# Patient Record
Sex: MALE | Race: OTHER | NOT HISPANIC OR LATINO | URBAN - METROPOLITAN AREA
[De-identification: names, ages, dates, MRNs, and addresses within clinical notes are randomized per-mention and may not be internally consistent; named-entity substitution may affect disease eponyms.]

---

## 2018-06-16 ENCOUNTER — INPATIENT (INPATIENT)
Facility: HOSPITAL | Age: 63
LOS: 4 days | Discharge: HOME | DRG: 179 | End: 2018-06-21
Attending: INTERNAL MEDICINE | Admitting: INTERNAL MEDICINE
Payer: COMMERCIAL

## 2018-06-16 VITALS — HEIGHT: 66 IN | WEIGHT: 154.98 LBS

## 2018-06-16 DIAGNOSIS — J18.9 PNEUMONIA, UNSPECIFIED ORGANISM: ICD-10-CM

## 2018-06-16 DIAGNOSIS — Z29.9 ENCOUNTER FOR PROPHYLACTIC MEASURES, UNSPECIFIED: ICD-10-CM

## 2018-06-16 LAB
ALBUMIN SERPL ELPH-MCNC: 2.8 G/DL — LOW (ref 3.5–5)
ALP SERPL-CCNC: 67 U/L — SIGNIFICANT CHANGE UP (ref 40–120)
ALT FLD-CCNC: 90 U/L DA — HIGH (ref 10–60)
ANION GAP SERPL CALC-SCNC: 9 MMOL/L — SIGNIFICANT CHANGE UP (ref 5–17)
APPEARANCE UR: CLEAR — SIGNIFICANT CHANGE UP
AST SERPL-CCNC: 113 U/L — HIGH (ref 10–40)
BASOPHILS # BLD AUTO: 0 K/UL — SIGNIFICANT CHANGE UP (ref 0–0.2)
BASOPHILS NFR BLD AUTO: 0.8 % — SIGNIFICANT CHANGE UP (ref 0–2)
BILIRUB SERPL-MCNC: 0.4 MG/DL — SIGNIFICANT CHANGE UP (ref 0.2–1.2)
BILIRUB UR-MCNC: NEGATIVE — SIGNIFICANT CHANGE UP
BUN SERPL-MCNC: 14 MG/DL — SIGNIFICANT CHANGE UP (ref 7–18)
CALCIUM SERPL-MCNC: 8.3 MG/DL — LOW (ref 8.4–10.5)
CHLORIDE SERPL-SCNC: 100 MMOL/L — SIGNIFICANT CHANGE UP (ref 96–108)
CO2 SERPL-SCNC: 25 MMOL/L — SIGNIFICANT CHANGE UP (ref 22–31)
COLOR SPEC: YELLOW — SIGNIFICANT CHANGE UP
CREAT SERPL-MCNC: 0.99 MG/DL — SIGNIFICANT CHANGE UP (ref 0.5–1.3)
DIFF PNL FLD: ABNORMAL
EOSINOPHIL # BLD AUTO: 0 K/UL — SIGNIFICANT CHANGE UP (ref 0–0.5)
EOSINOPHIL NFR BLD AUTO: 0 % — SIGNIFICANT CHANGE UP (ref 0–6)
GLUCOSE SERPL-MCNC: 126 MG/DL — HIGH (ref 70–99)
GLUCOSE UR QL: 50 MG/DL
HCT VFR BLD CALC: 38.8 % — LOW (ref 39–50)
HGB BLD-MCNC: 12.8 G/DL — LOW (ref 13–17)
KETONES UR-MCNC: ABNORMAL
LACTATE SERPL-SCNC: 1.1 MMOL/L — SIGNIFICANT CHANGE UP (ref 0.7–2)
LEUKOCYTE ESTERASE UR-ACNC: NEGATIVE — SIGNIFICANT CHANGE UP
LYMPHOCYTES # BLD AUTO: 0.5 K/UL — LOW (ref 1–3.3)
LYMPHOCYTES # BLD AUTO: 9.2 % — LOW (ref 13–44)
MCHC RBC-ENTMCNC: 30.7 PG — SIGNIFICANT CHANGE UP (ref 27–34)
MCHC RBC-ENTMCNC: 33.1 GM/DL — SIGNIFICANT CHANGE UP (ref 32–36)
MCV RBC AUTO: 92.9 FL — SIGNIFICANT CHANGE UP (ref 80–100)
MONOCYTES # BLD AUTO: 0.3 K/UL — SIGNIFICANT CHANGE UP (ref 0–0.9)
MONOCYTES NFR BLD AUTO: 5 % — SIGNIFICANT CHANGE UP (ref 2–14)
NEUTROPHILS # BLD AUTO: 4.7 K/UL — SIGNIFICANT CHANGE UP (ref 1.8–7.4)
NEUTROPHILS NFR BLD AUTO: 85 % — HIGH (ref 43–77)
NITRITE UR-MCNC: NEGATIVE — SIGNIFICANT CHANGE UP
PH UR: 6 — SIGNIFICANT CHANGE UP (ref 5–8)
PLATELET # BLD AUTO: 244 K/UL — SIGNIFICANT CHANGE UP (ref 150–400)
POTASSIUM SERPL-MCNC: 3.2 MMOL/L — LOW (ref 3.5–5.3)
POTASSIUM SERPL-SCNC: 3.2 MMOL/L — LOW (ref 3.5–5.3)
PROT SERPL-MCNC: 7 G/DL — SIGNIFICANT CHANGE UP (ref 6–8.3)
PROT UR-MCNC: 30 MG/DL
RAPID RVP RESULT: SIGNIFICANT CHANGE UP
RBC # BLD: 4.17 M/UL — LOW (ref 4.2–5.8)
RBC # FLD: 12.7 % — SIGNIFICANT CHANGE UP (ref 10.3–14.5)
SODIUM SERPL-SCNC: 134 MMOL/L — LOW (ref 135–145)
SP GR SPEC: 1.02 — SIGNIFICANT CHANGE UP (ref 1.01–1.02)
UROBILINOGEN FLD QL: 1
WBC # BLD: 5.5 K/UL — SIGNIFICANT CHANGE UP (ref 3.8–10.5)
WBC # FLD AUTO: 5.5 K/UL — SIGNIFICANT CHANGE UP (ref 3.8–10.5)

## 2018-06-16 PROCEDURE — 99284 EMERGENCY DEPT VISIT MOD MDM: CPT

## 2018-06-16 PROCEDURE — 71045 X-RAY EXAM CHEST 1 VIEW: CPT | Mod: 26

## 2018-06-16 RX ORDER — ONDANSETRON 8 MG/1
4 TABLET, FILM COATED ORAL ONCE
Qty: 0 | Refills: 0 | Status: COMPLETED | OUTPATIENT
Start: 2018-06-16 | End: 2018-06-16

## 2018-06-16 RX ORDER — IPRATROPIUM/ALBUTEROL SULFATE 18-103MCG
3 AEROSOL WITH ADAPTER (GRAM) INHALATION EVERY 6 HOURS
Qty: 0 | Refills: 0 | Status: DISCONTINUED | OUTPATIENT
Start: 2018-06-16 | End: 2018-06-21

## 2018-06-16 RX ORDER — IBUPROFEN 200 MG
600 TABLET ORAL THREE TIMES A DAY
Qty: 0 | Refills: 0 | Status: DISCONTINUED | OUTPATIENT
Start: 2018-06-16 | End: 2018-06-21

## 2018-06-16 RX ORDER — ONDANSETRON 8 MG/1
4 TABLET, FILM COATED ORAL EVERY 6 HOURS
Qty: 0 | Refills: 0 | Status: DISCONTINUED | OUTPATIENT
Start: 2018-06-16 | End: 2018-06-21

## 2018-06-16 RX ORDER — KETOROLAC TROMETHAMINE 30 MG/ML
30 SYRINGE (ML) INJECTION ONCE
Qty: 0 | Refills: 0 | Status: DISCONTINUED | OUTPATIENT
Start: 2018-06-16 | End: 2018-06-16

## 2018-06-16 RX ORDER — ACETAMINOPHEN 500 MG
650 TABLET ORAL EVERY 6 HOURS
Qty: 0 | Refills: 0 | Status: DISCONTINUED | OUTPATIENT
Start: 2018-06-16 | End: 2018-06-21

## 2018-06-16 RX ORDER — ENOXAPARIN SODIUM 100 MG/ML
40 INJECTION SUBCUTANEOUS DAILY
Qty: 0 | Refills: 0 | Status: DISCONTINUED | OUTPATIENT
Start: 2018-06-16 | End: 2018-06-21

## 2018-06-16 RX ORDER — POTASSIUM CHLORIDE 20 MEQ
40 PACKET (EA) ORAL ONCE
Qty: 0 | Refills: 0 | Status: COMPLETED | OUTPATIENT
Start: 2018-06-16 | End: 2018-06-16

## 2018-06-16 RX ORDER — SODIUM CHLORIDE 9 MG/ML
2000 INJECTION INTRAMUSCULAR; INTRAVENOUS; SUBCUTANEOUS ONCE
Qty: 0 | Refills: 0 | Status: COMPLETED | OUTPATIENT
Start: 2018-06-16 | End: 2018-06-16

## 2018-06-16 RX ORDER — ACETAMINOPHEN 500 MG
650 TABLET ORAL ONCE
Qty: 0 | Refills: 0 | Status: COMPLETED | OUTPATIENT
Start: 2018-06-16 | End: 2018-06-16

## 2018-06-16 RX ORDER — PANTOPRAZOLE SODIUM 20 MG/1
40 TABLET, DELAYED RELEASE ORAL DAILY
Qty: 0 | Refills: 0 | Status: DISCONTINUED | OUTPATIENT
Start: 2018-06-16 | End: 2018-06-21

## 2018-06-16 RX ORDER — SODIUM CHLORIDE 9 MG/ML
1000 INJECTION INTRAMUSCULAR; INTRAVENOUS; SUBCUTANEOUS
Qty: 0 | Refills: 0 | Status: DISCONTINUED | OUTPATIENT
Start: 2018-06-16 | End: 2018-06-18

## 2018-06-16 RX ADMIN — SODIUM CHLORIDE 1000 MILLILITER(S): 9 INJECTION INTRAMUSCULAR; INTRAVENOUS; SUBCUTANEOUS at 13:46

## 2018-06-16 RX ADMIN — Medication 30 MILLIGRAM(S): at 13:52

## 2018-06-16 RX ADMIN — Medication 30 MILLIGRAM(S): at 13:46

## 2018-06-16 RX ADMIN — Medication 650 MILLIGRAM(S): at 13:46

## 2018-06-16 RX ADMIN — Medication 40 MILLIEQUIVALENT(S): at 15:07

## 2018-06-16 RX ADMIN — SODIUM CHLORIDE 75 MILLILITER(S): 9 INJECTION INTRAMUSCULAR; INTRAVENOUS; SUBCUTANEOUS at 21:16

## 2018-06-16 NOTE — H&P ADULT - HISTORY OF PRESENT ILLNESS
Patient is a 62M from home, lives with wife and son, with PMH former smoker presenting with 1 week dry cough, generalized myalgias, fever (max 103), nausea, abdominal pain and poor po intake. Reports that his building is undergoing alot of window construction and he has inhaled alot of chemicals over the past week, does not have an AC in his apartment. No sick contacts or travel. Denies SOB, palpitations, diarrhea or constipation. PCP prescribed po augmentin 875mg BID x 1 week, has not completed course, and PRN ibuprofen for fever, but symptoms have not resolved. First time patient has had these symptoms.    PMH: as per HPI  Surgical Hx: nose surgery  Fam Hx: father- heart disease, mother- DM  Social Hx: former smoker- 1 pack/day - quit 5 years ago- 30 pack year; neg for etoh  Allergies: NKDA

## 2018-06-16 NOTE — H&P ADULT - NSHPLABSRESULTS_GEN_ALL_CORE
LABS:                        12.8   5.5   )-----------( 244      ( 16 Jun 2018 13:42 )             38.8     06-16    134<L>  |  100  |  14  ----------------------------<  126<H>  3.2<L>   |  25  |  0.99    Ca    8.3<L>      16 Jun 2018 13:42    TPro  7.0  /  Alb  2.8<L>  /  TBili  0.4  /  DBili  x   /  AST  113<H>  /  ALT  90<H>  /  AlkPhos  67  06-16    < from: Xray Chest 1 View- PORTABLE-Urgent (06.16.18 @ 13:45) >    IMPRESSION:    Left lower lung consolidation, suspicious for pneumonia.     < end of copied text >

## 2018-06-16 NOTE — H&P ADULT - NSHPPHYSICALEXAM_GEN_ALL_CORE
INTERVAL HPI/OVERNIGHT EVENTS:  T(C): 37.9 (06-16-18 @ 15:26), Max: 38.7 (06-16-18 @ 13:11)  HR: 75 (06-16-18 @ 15:26) (75 - 90)  BP: 102/63 (06-16-18 @ 15:26) (102/63 - 121/81)  RR: 18 (06-16-18 @ 15:26) (18 - 18)  SpO2: 96% (06-16-18 @ 15:26) (96% - 97%)    PHYSICAL EXAM:  GENERAL: NAD, well-groomed, well-developed  HEAD:  Atraumatic, Normocephalic  EYES: EOMI, PERRLA, conjunctiva and sclera clear  ENMT: No tonsillar erythema, exudates, or enlargement; Moist mucous membranes, Good dentition, No lesions  NECK: Supple, No JVD, Normal thyroid  NERVOUS SYSTEM:  Alert & Oriented X3, Good concentration; Motor Strength 5/5 B/L upper and lower extremities; DTRs 2+ intact and symmetric  CHEST/LUNG: L lower lobe reduced air entry  HEART: Regular rate and rhythm; No murmurs, rubs, or gallops  ABDOMEN: Soft, Nontender, Nondistended; Bowel sounds present  EXTREMITIES:  2+ Peripheral Pulses, No clubbing, cyanosis, or edema  LYMPH: No lymphadenopathy noted  SKIN: No rashes or lesions

## 2018-06-16 NOTE — ED PROVIDER NOTE - OBJECTIVE STATEMENT
62M p/w fever x 5 days. Patient states went to PCP 3 days ago. Did CXR showing pna. Given augmentin and motrin. Symptoms persist. Now developing headache and myalgia. no SOB or CP. no abd pain. no /GI symptoms. no back pain. no visual changes. no ataxia. no neuro complaints.

## 2018-06-16 NOTE — H&P ADULT - PROBLEM SELECTOR PLAN 1
1 week dry cough, generalized myalgias, fever (max 103), nausea, abdominal pain and poor po intake  f/u RVP  CXR LLL consolidation  CT chest 2/2 former smoker  tylenol PRN  failed po augmentin  levaquin to cover for MRSA and legionella  f/u urine legionella  f/u strep pneumo Ag  bronchodilators  robitussin PRN cough  clear liquid diet and advance as tolerated  protonix IV while NPO for retching 2/2 PNA ? viral  gentle hydration

## 2018-06-16 NOTE — PATIENT PROFILE ADULT. - LANGUAGE ASSISTANCE NEEDED
pt. speaks and understand English/No-Patient/Caregiver offered and refused free interpretation services.

## 2018-06-16 NOTE — H&P ADULT - PROBLEM SELECTOR PLAN 2
IMPROVE VTE Individual Risk Assessment          RISK                                                          Points  [  ] Previous VTE                                                3  [  ] Thrombophilia                                             2  [  ] Lower limb paralysis                                   2        (unable to hold up >15 seconds)    [  ] Current Cancer                                             2         (within 6 months)  [  x] Immobilization > 24 hrs                              1  [  ] ICU/CCU stay > 24 hours                             1  [ x ] Age > 60                                                         1    IMPROVE VTE Score: lovenox for dvt ppx IMPROVE VTE Individual Risk Assessment.          RISK                                                          Points  [  ] Previous VTE                                                3  [  ] Thrombophilia                                             2  [  ] Lower limb paralysis                                   2        (unable to hold up >15 seconds)    [  ] Current Cancer                                             2         (within 6 months)  [  x] Immobilization > 24 hrs                              1  [  ] ICU/CCU stay > 24 hours                             1  [ x ] Age > 60                                                         1    IMPROVE VTE Score: lovenox for dvt ppx

## 2018-06-17 LAB
ALBUMIN SERPL ELPH-MCNC: 2.2 G/DL — LOW (ref 3.5–5)
ALP SERPL-CCNC: 61 U/L — SIGNIFICANT CHANGE UP (ref 40–120)
ALT FLD-CCNC: 95 U/L DA — HIGH (ref 10–60)
ANION GAP SERPL CALC-SCNC: 10 MMOL/L — SIGNIFICANT CHANGE UP (ref 5–17)
APPEARANCE UR: CLEAR — SIGNIFICANT CHANGE UP
AST SERPL-CCNC: 115 U/L — HIGH (ref 10–40)
BASOPHILS # BLD AUTO: 0 K/UL — SIGNIFICANT CHANGE UP (ref 0–0.2)
BASOPHILS NFR BLD AUTO: 0.7 % — SIGNIFICANT CHANGE UP (ref 0–2)
BILIRUB SERPL-MCNC: 0.3 MG/DL — SIGNIFICANT CHANGE UP (ref 0.2–1.2)
BILIRUB UR-MCNC: NEGATIVE — SIGNIFICANT CHANGE UP
BUN SERPL-MCNC: 14 MG/DL — SIGNIFICANT CHANGE UP (ref 7–18)
CALCIUM SERPL-MCNC: 7.3 MG/DL — LOW (ref 8.4–10.5)
CHLORIDE SERPL-SCNC: 106 MMOL/L — SIGNIFICANT CHANGE UP (ref 96–108)
CHOLEST SERPL-MCNC: 134 MG/DL — SIGNIFICANT CHANGE UP (ref 10–199)
CO2 SERPL-SCNC: 21 MMOL/L — LOW (ref 22–31)
COLOR SPEC: YELLOW — SIGNIFICANT CHANGE UP
CREAT SERPL-MCNC: 0.79 MG/DL — SIGNIFICANT CHANGE UP (ref 0.5–1.3)
DIFF PNL FLD: ABNORMAL
EOSINOPHIL # BLD AUTO: 0 K/UL — SIGNIFICANT CHANGE UP (ref 0–0.5)
EOSINOPHIL NFR BLD AUTO: 0.1 % — SIGNIFICANT CHANGE UP (ref 0–6)
FOLATE SERPL-MCNC: 17.3 NG/ML — SIGNIFICANT CHANGE UP
GLUCOSE SERPL-MCNC: 115 MG/DL — HIGH (ref 70–99)
GLUCOSE UR QL: 50 MG/DL
HBA1C BLD-MCNC: 6.6 % — HIGH (ref 4–5.6)
HCT VFR BLD CALC: 34.6 % — LOW (ref 39–50)
HDLC SERPL-MCNC: 20 MG/DL — LOW (ref 40–125)
HGB BLD-MCNC: 11.7 G/DL — LOW (ref 13–17)
INR BLD: 1.22 RATIO — HIGH (ref 0.88–1.16)
KETONES UR-MCNC: ABNORMAL
LEGIONELLA AG UR QL: POSITIVE
LEUKOCYTE ESTERASE UR-ACNC: NEGATIVE — SIGNIFICANT CHANGE UP
LIPID PNL WITH DIRECT LDL SERPL: 86 MG/DL — SIGNIFICANT CHANGE UP
LYMPHOCYTES # BLD AUTO: 0.8 K/UL — LOW (ref 1–3.3)
LYMPHOCYTES # BLD AUTO: 15.3 % — SIGNIFICANT CHANGE UP (ref 13–44)
MAGNESIUM SERPL-MCNC: 2.2 MG/DL — SIGNIFICANT CHANGE UP (ref 1.6–2.6)
MCHC RBC-ENTMCNC: 31.2 PG — SIGNIFICANT CHANGE UP (ref 27–34)
MCHC RBC-ENTMCNC: 33.9 GM/DL — SIGNIFICANT CHANGE UP (ref 32–36)
MCV RBC AUTO: 92.2 FL — SIGNIFICANT CHANGE UP (ref 80–100)
MONOCYTES # BLD AUTO: 0.3 K/UL — SIGNIFICANT CHANGE UP (ref 0–0.9)
MONOCYTES NFR BLD AUTO: 5.8 % — SIGNIFICANT CHANGE UP (ref 2–14)
NEUTROPHILS # BLD AUTO: 4 K/UL — SIGNIFICANT CHANGE UP (ref 1.8–7.4)
NEUTROPHILS NFR BLD AUTO: 78.2 % — HIGH (ref 43–77)
NITRITE UR-MCNC: NEGATIVE — SIGNIFICANT CHANGE UP
PH UR: 6.5 — SIGNIFICANT CHANGE UP (ref 5–8)
PHOSPHATE SERPL-MCNC: 1.3 MG/DL — LOW (ref 2.5–4.5)
PLATELET # BLD AUTO: 240 K/UL — SIGNIFICANT CHANGE UP (ref 150–400)
POTASSIUM SERPL-MCNC: 3.5 MMOL/L — SIGNIFICANT CHANGE UP (ref 3.5–5.3)
POTASSIUM SERPL-SCNC: 3.5 MMOL/L — SIGNIFICANT CHANGE UP (ref 3.5–5.3)
PROT SERPL-MCNC: 5.7 G/DL — LOW (ref 6–8.3)
PROT UR-MCNC: 30 MG/DL
PROTHROM AB SERPL-ACNC: 13.4 SEC — HIGH (ref 9.8–12.7)
RBC # BLD: 3.75 M/UL — LOW (ref 4.2–5.8)
RBC # FLD: 12.2 % — SIGNIFICANT CHANGE UP (ref 10.3–14.5)
SODIUM SERPL-SCNC: 137 MMOL/L — SIGNIFICANT CHANGE UP (ref 135–145)
SP GR SPEC: 1.01 — SIGNIFICANT CHANGE UP (ref 1.01–1.02)
TOTAL CHOLESTEROL/HDL RATIO MEASUREMENT: 6.7 RATIO — SIGNIFICANT CHANGE UP (ref 3.4–9.6)
TRIGL SERPL-MCNC: 142 MG/DL — SIGNIFICANT CHANGE UP (ref 10–149)
TSH SERPL-MCNC: 0.53 UU/ML — SIGNIFICANT CHANGE UP (ref 0.34–4.82)
UROBILINOGEN FLD QL: NEGATIVE — SIGNIFICANT CHANGE UP
VIT B12 SERPL-MCNC: 1871 PG/ML — HIGH (ref 232–1245)
WBC # BLD: 5.1 K/UL — SIGNIFICANT CHANGE UP (ref 3.8–10.5)
WBC # FLD AUTO: 5.1 K/UL — SIGNIFICANT CHANGE UP (ref 3.8–10.5)

## 2018-06-17 PROCEDURE — 71250 CT THORAX DX C-: CPT | Mod: 26

## 2018-06-17 RX ADMIN — Medication 3 MILLILITER(S): at 09:23

## 2018-06-17 RX ADMIN — Medication 40 MILLIEQUIVALENT(S): at 00:32

## 2018-06-17 RX ADMIN — PANTOPRAZOLE SODIUM 40 MILLIGRAM(S): 20 TABLET, DELAYED RELEASE ORAL at 11:31

## 2018-06-17 RX ADMIN — Medication 650 MILLIGRAM(S): at 22:39

## 2018-06-17 RX ADMIN — Medication 3 MILLILITER(S): at 14:05

## 2018-06-17 RX ADMIN — Medication 650 MILLIGRAM(S): at 06:09

## 2018-06-17 RX ADMIN — ENOXAPARIN SODIUM 40 MILLIGRAM(S): 100 INJECTION SUBCUTANEOUS at 11:31

## 2018-06-17 RX ADMIN — Medication 600 MILLIGRAM(S): at 22:38

## 2018-06-17 RX ADMIN — Medication 3 MILLILITER(S): at 21:35

## 2018-06-17 RX ADMIN — Medication 3 MILLILITER(S): at 02:50

## 2018-06-17 NOTE — PROGRESS NOTE ADULT - PROBLEM SELECTOR PLAN 2
IMPROVE VTE Individual Risk Assessment.          RISK                                                          Points  [  ] Previous VTE                                                3  [  ] Thrombophilia                                             2  [  ] Lower limb paralysis                                   2        (unable to hold up >15 seconds)    [  ] Current Cancer                                             2         (within 6 months)  [  x] Immobilization > 24 hrs                              1  [  ] ICU/CCU stay > 24 hours                             1  [ x ] Age > 60                                                         1    IMPROVE VTE Score: lovenox for dvt ppx

## 2018-06-17 NOTE — PROGRESS NOTE ADULT - SUBJECTIVE AND OBJECTIVE BOX
chief complaint : fever        SUBJECTIVE / OVERNIGHT EVENTS: pt denies chest pain, shortness of breath, nausea,v        MEDICATIONS  (STANDING):  ALBUTerol/ipratropium for Nebulization 3 milliLiter(s) Nebulizer every 6 hours  enoxaparin Injectable 40 milliGRAM(s) SubCutaneous daily  levoFLOXacin IVPB 500 milliGRAM(s) IV Intermittent every 24 hours  pantoprazole  Injectable 40 milliGRAM(s) IV Push daily  sodium chloride 0.9%. 1000 milliLiter(s) (75 mL/Hr) IV Continuous <Continuous>    MEDICATIONS  (PRN):  acetaminophen   Tablet 650 milliGRAM(s) Oral every 6 hours PRN For Temp greater than 38 C (100.4 F)  guaiFENesin   Syrup  (Sugar-Free) 200 milliGRAM(s) Oral every 6 hours PRN Cough  ibuprofen  Tablet 600 milliGRAM(s) Oral three times a day PRN myalgia  ondansetron Injectable 4 milliGRAM(s) IV Push every 6 hours PRN Nausea and/or Vomiting        CAPILLARY BLOOD GLUCOSE        I&O's Summary    Vital Signs Last 24 Hrs  T(C): 38.2 (2018 21:50), Max: 38.4 (2018 05:58)  T(F): 100.7 (2018 21:50), Max: 101.1 (2018 05:58)  HR: 63 (2018 21:50) (63 - 71)  BP: 126/70 (2018 21:50) (96/57 - 126/70)  BP(mean): --  RR: 17 (2018 21:50) (14 - 17)  SpO2: 100% (2018 21:50) (97% - 100%)    Constitutional: No fever, fatigue  Skin: No rash.  Eyes: No recent vision problems or eye pain.  ENT: No congestion, ear pain, or sore throat.  Cardiovascular: No chest pain or palpation.  Respiratory: No cough, shortness of breath, congestion, or wheezing.  Gastrointestinal: No abdominal pain, nausea, vomiting, or diarrhea.  Genitourinary: No dysuria.  Musculoskeletal: No joint swelling.  Neurologic: No headache.    PHYSICAL EXAM:  GENERAL: NAD  EYES: EOMI, PERRLA  NECK: Supple, No JVD  CHEST/LUNG: dec breath sounds at bases   HEART:  S1 , S2 +  ABDOMEN: soft bs+  EXTREMITIES:  no edema  NEUROLOGY:alert awake calm cooperative      LABS:                        11.7   5.1   )-----------( 240      ( 2018 05:36 )             34.6         137  |  106  |  14  ----------------------------<  115<H>  3.5   |  21<L>  |  0.79    Ca    7.3<L>      2018 05:36  Phos  1.3       Mg     2.2         TPro  5.7<L>  /  Alb  2.2<L>  /  TBili  0.3  /  DBili  x   /  AST  115<H>  /  ALT  95<H>  /  AlkPhos  61      PT/INR - ( 2018 05:36 )   PT: 13.4 sec;   INR: 1.22 ratio               Urinalysis Basic - ( 2018 03:22 )    Color: Yellow / Appearance: Clear / S.015 / pH: x  Gluc: x / Ketone: Moderate  / Bili: Negative / Urobili: Negative   Blood: x / Protein: 30 mg/dL / Nitrite: Negative   Leuk Esterase: Negative / RBC: 2-5 /HPF / WBC 0-2 /HPF   Sq Epi: x / Non Sq Epi: Few /HPF / Bacteria: Few /HPF        RADIOLOGY & ADDITIONAL TESTS:    Imaging Personally Reviewed:    Consultant(s) Notes Reviewed:      Care Discussed with Consultants/Other Providers:

## 2018-06-18 DIAGNOSIS — A48.1 LEGIONNAIRES' DISEASE: ICD-10-CM

## 2018-06-18 LAB
24R-OH-CALCIDIOL SERPL-MCNC: 35.2 NG/ML — SIGNIFICANT CHANGE UP (ref 30–80)
ALBUMIN SERPL ELPH-MCNC: 2.2 G/DL — LOW (ref 3.5–5)
ALP SERPL-CCNC: 62 U/L — SIGNIFICANT CHANGE UP (ref 40–120)
ALT FLD-CCNC: 108 U/L DA — HIGH (ref 10–60)
ANION GAP SERPL CALC-SCNC: 8 MMOL/L — SIGNIFICANT CHANGE UP (ref 5–17)
AST SERPL-CCNC: 91 U/L — HIGH (ref 10–40)
BASOPHILS # BLD AUTO: 0.1 K/UL — SIGNIFICANT CHANGE UP (ref 0–0.2)
BASOPHILS NFR BLD AUTO: 1.3 % — SIGNIFICANT CHANGE UP (ref 0–2)
BILIRUB SERPL-MCNC: 0.4 MG/DL — SIGNIFICANT CHANGE UP (ref 0.2–1.2)
BUN SERPL-MCNC: 7 MG/DL — SIGNIFICANT CHANGE UP (ref 7–18)
CALCIUM SERPL-MCNC: 7.8 MG/DL — LOW (ref 8.4–10.5)
CHLORIDE SERPL-SCNC: 108 MMOL/L — SIGNIFICANT CHANGE UP (ref 96–108)
CO2 SERPL-SCNC: 24 MMOL/L — SIGNIFICANT CHANGE UP (ref 22–31)
CREAT SERPL-MCNC: 0.75 MG/DL — SIGNIFICANT CHANGE UP (ref 0.5–1.3)
CULTURE RESULTS: NO GROWTH — SIGNIFICANT CHANGE UP
EOSINOPHIL # BLD AUTO: 0.1 K/UL — SIGNIFICANT CHANGE UP (ref 0–0.5)
EOSINOPHIL NFR BLD AUTO: 3.2 % — SIGNIFICANT CHANGE UP (ref 0–6)
GLUCOSE SERPL-MCNC: 105 MG/DL — HIGH (ref 70–99)
HCT VFR BLD CALC: 32.8 % — LOW (ref 39–50)
HGB BLD-MCNC: 11.5 G/DL — LOW (ref 13–17)
LYMPHOCYTES # BLD AUTO: 1.4 K/UL — SIGNIFICANT CHANGE UP (ref 1–3.3)
LYMPHOCYTES # BLD AUTO: 31.4 % — SIGNIFICANT CHANGE UP (ref 13–44)
MAGNESIUM SERPL-MCNC: 2.7 MG/DL — HIGH (ref 1.6–2.6)
MCHC RBC-ENTMCNC: 31.9 PG — SIGNIFICANT CHANGE UP (ref 27–34)
MCHC RBC-ENTMCNC: 35 GM/DL — SIGNIFICANT CHANGE UP (ref 32–36)
MCV RBC AUTO: 91.2 FL — SIGNIFICANT CHANGE UP (ref 80–100)
MONOCYTES # BLD AUTO: 0.4 K/UL — SIGNIFICANT CHANGE UP (ref 0–0.9)
MONOCYTES NFR BLD AUTO: 8.5 % — SIGNIFICANT CHANGE UP (ref 2–14)
NEUTROPHILS # BLD AUTO: 2.4 K/UL — SIGNIFICANT CHANGE UP (ref 1.8–7.4)
NEUTROPHILS NFR BLD AUTO: 55.5 % — SIGNIFICANT CHANGE UP (ref 43–77)
PHOSPHATE SERPL-MCNC: 2.5 MG/DL — SIGNIFICANT CHANGE UP (ref 2.5–4.5)
PLATELET # BLD AUTO: 277 K/UL — SIGNIFICANT CHANGE UP (ref 150–400)
POTASSIUM SERPL-MCNC: 3.4 MMOL/L — LOW (ref 3.5–5.3)
POTASSIUM SERPL-SCNC: 3.4 MMOL/L — LOW (ref 3.5–5.3)
PROT SERPL-MCNC: 6 G/DL — SIGNIFICANT CHANGE UP (ref 6–8.3)
RBC # BLD: 3.6 M/UL — LOW (ref 4.2–5.8)
RBC # FLD: 12.3 % — SIGNIFICANT CHANGE UP (ref 10.3–14.5)
SODIUM SERPL-SCNC: 140 MMOL/L — SIGNIFICANT CHANGE UP (ref 135–145)
SPECIMEN SOURCE: SIGNIFICANT CHANGE UP
WBC # BLD: 4.3 K/UL — SIGNIFICANT CHANGE UP (ref 3.8–10.5)
WBC # FLD AUTO: 4.3 K/UL — SIGNIFICANT CHANGE UP (ref 3.8–10.5)

## 2018-06-18 PROCEDURE — 71045 X-RAY EXAM CHEST 1 VIEW: CPT | Mod: 26

## 2018-06-18 RX ORDER — IPRATROPIUM/ALBUTEROL SULFATE 18-103MCG
3 AEROSOL WITH ADAPTER (GRAM) INHALATION ONCE
Qty: 0 | Refills: 0 | Status: COMPLETED | OUTPATIENT
Start: 2018-06-18 | End: 2018-06-18

## 2018-06-18 RX ORDER — POTASSIUM CHLORIDE 20 MEQ
40 PACKET (EA) ORAL ONCE
Qty: 0 | Refills: 0 | Status: COMPLETED | OUTPATIENT
Start: 2018-06-18 | End: 2018-06-18

## 2018-06-18 RX ADMIN — Medication 3 MILLILITER(S): at 06:50

## 2018-06-18 RX ADMIN — Medication 600 MILLIGRAM(S): at 00:20

## 2018-06-18 RX ADMIN — Medication 3 MILLILITER(S): at 20:41

## 2018-06-18 RX ADMIN — Medication 40 MILLIEQUIVALENT(S): at 14:55

## 2018-06-18 RX ADMIN — Medication 3 MILLILITER(S): at 14:05

## 2018-06-18 RX ADMIN — ENOXAPARIN SODIUM 40 MILLIGRAM(S): 100 INJECTION SUBCUTANEOUS at 11:19

## 2018-06-18 RX ADMIN — PANTOPRAZOLE SODIUM 40 MILLIGRAM(S): 20 TABLET, DELAYED RELEASE ORAL at 11:18

## 2018-06-18 RX ADMIN — Medication 3 MILLILITER(S): at 08:55

## 2018-06-18 NOTE — PROGRESS NOTE ADULT - PROBLEM SELECTOR PLAN 1
CT Chest: LLL infiltrate with B/L effusions CT Chest: LLL infiltrate with B/L effusions  Urine Legionella Positive  Low grade fevers  Blood Cx: NTD  C/w Levaquin

## 2018-06-18 NOTE — PROGRESS NOTE ADULT - SUBJECTIVE AND OBJECTIVE BOX
PGY 1 Note discussed with supervising resident and primary attending    Patient is a 62y old  Male who presents with a chief complaint of fever (2018 16:25)      INTERVAL HPI/OVERNIGHT EVENTS: offers no new complaints; current symptoms resolving    MEDICATIONS  (STANDING):  ALBUTerol/ipratropium for Nebulization 3 milliLiter(s) Nebulizer every 6 hours  enoxaparin Injectable 40 milliGRAM(s) SubCutaneous daily  levoFLOXacin IVPB 750 milliGRAM(s) IV Intermittent every 24 hours  pantoprazole  Injectable 40 milliGRAM(s) IV Push daily  sodium chloride 0.9%. 1000 milliLiter(s) (75 mL/Hr) IV Continuous <Continuous>    MEDICATIONS  (PRN):  acetaminophen   Tablet 650 milliGRAM(s) Oral every 6 hours PRN For Temp greater than 38 C (100.4 F)  guaiFENesin   Syrup  (Sugar-Free) 200 milliGRAM(s) Oral every 6 hours PRN Cough  ibuprofen  Tablet 600 milliGRAM(s) Oral three times a day PRN myalgia  ondansetron Injectable 4 milliGRAM(s) IV Push every 6 hours PRN Nausea and/or Vomiting      __________________________________________________    Vital Signs Last 24 Hrs  T(C): 36.7 (2018 05:55), Max: 38.2 (2018 21:50)  T(F): 98 (2018 05:55), Max: 100.7 (2018 21:50)  HR: 59 (2018 05:55) (59 - 70)  BP: 132/79 (2018 05:55) (96/57 - 132/79)  BP(mean): --  RR: 18 (2018 05:55) (14 - 18)  SpO2: 97% (2018 05:55) (97% - 100%)    ________________________________________________  PHYSICAL EXAM:  GENERAL: NAD Young [place] male   HEENT: Normocephalic;  conjunctivae and sclerae clear; moist mucous membranes;   NECK : supple  CHEST/LUNG: Clear to auscultation bilaterally, LLL infiltrate, Mild cough    HEART: S1 S2  regular; no murmurs, gallops or rubs  ABDOMEN: Soft, Nontender, Nondistended; Bowel sounds present  EXTREMITIES: no cyanosis; no edema; no calf tenderness  SKIN: warm and dry; no rash  NERVOUS SYSTEM:  Awake and alert x 3    _________________________________________________  LABS:                        11.5   4.3   )-----------( 277      ( 2018 06:17 )             32.8     06-18    140  |  108  |  7   ----------------------------<  105<H>  3.4<L>   |  24  |  0.75    Ca    7.8<L>      2018 06:17  Phos  2.5     -18  Mg     2.7     -18    TPro  6.0  /  Alb  2.2<L>  /  TBili  0.4  /  DBili  x   /  AST  91<H>  /  ALT  108<H>  /  AlkPhos  62  06-18    PT/INR - ( 2018 05:36 )   PT: 13.4 sec;   INR: 1.22 ratio           Urinalysis Basic - ( 2018 03:22 )    Color: Yellow / Appearance: Clear / S.015 / pH: x  Gluc: x / Ketone: Moderate  / Bili: Negative / Urobili: Negative   Blood: x / Protein: 30 mg/dL / Nitrite: Negative   Leuk Esterase: Negative / RBC: 2-5 /HPF / WBC 0-2 /HPF   Sq Epi: x / Non Sq Epi: Few /HPF / Bacteria: Few /HPF        RADIOLOGY & ADDITIONAL TESTS:    CT Chest: Small bilateral effusions. Left lower lobe low-density consolidation with   air bronchograms likely pneumonia. Correlate clinically and follow to   resolution.      Plan of care was discussed with patient and /or primary care giver; all questions and concerns were addressed and care was aligned with patient's wishes.

## 2018-06-18 NOTE — PROGRESS NOTE ADULT - PROBLEM SELECTOR PLAN 2
IMPROVE VTE Individual Risk Assessment.          RISK                                                          Points  [  ] Previous VTE                                                3  [  ] Thrombophilia                                             2  [  ] Lower limb paralysis                                   2        (unable to hold up >15 seconds)    [  ] Current Cancer                                             2         (within 6 months)  [  x] Immobilization > 24 hrs                              1  [  ] ICU/CCU stay > 24 hours                             1  [ x ] Age > 60                                                         1    IMPROVE VTE Score: lovenox for dvt ppx IMPROVE VTE Individual Risk Assessment.          RISK                                                          Points  [  ] Previous VTE                                                3  [  ] Thrombophilia                                             2  [  ] Lower limb paralysis                                   2        (unable to hold up >15 seconds)    [  ] Current Cancer                                             2         (within 6 months)  [  x] Immobilization > 24 hrs                              1  [  ] ICU/CCU stay > 24 hours                             1  [ x ] Age > 60                                                         1    IMPROVE VTE Score: lovenox for dvt ppx  Protonix for GI ppx

## 2018-06-18 NOTE — CONSULT NOTE ADULT - SUBJECTIVE AND OBJECTIVE BOX
CHIEF COMPLAINT: Patient is a 62y old  Male who presents with a chief complaint of fever (2018 16:25)      HPI:  Patient is a 62M from home, lives with wife and son, with PMH former smoker presenting with 1 week dry cough, generalized myalgias, fever (max 103), nausea, abdominal pain and poor po intake. Reports that his building is undergoing alot of window construction and he has inhaled alot of chemicals over the past week, does not have an AC in his apartment. No sick contacts or travel. Denies SOB, palpitations, diarrhea or constipation. PCP prescribed po augmentin 875mg BID x 1 week, has not completed course, and PRN ibuprofen for fever, but symptoms have not resolved. First time patient has had these symptoms.    PMH: as per HPI  Surgical Hx: nose surgery  Fam Hx: father- heart disease, mother- DM  Social Hx: former smoker- 1 pack/day - quit 5 years ago- 30 pack year; neg for etoh  Allergies: NKDA (2018 16:25)   Patient seen and examined.     PAST MEDICAL & SURGICAL HISTORY:  No pertinent past medical history  No significant past surgical history      Allergies    No Known Allergies    Intolerances        MEDICATIONS  (STANDING):  ALBUTerol/ipratropium for Nebulization 3 milliLiter(s) Nebulizer every 6 hours  enoxaparin Injectable 40 milliGRAM(s) SubCutaneous daily  levoFLOXacin IVPB 750 milliGRAM(s) IV Intermittent every 24 hours  pantoprazole  Injectable 40 milliGRAM(s) IV Push daily  sodium chloride 0.9%. 1000 milliLiter(s) (75 mL/Hr) IV Continuous <Continuous>      MEDICATIONS  (PRN):  acetaminophen   Tablet 650 milliGRAM(s) Oral every 6 hours PRN For Temp greater than 38 C (100.4 F)  guaiFENesin   Syrup  (Sugar-Free) 200 milliGRAM(s) Oral every 6 hours PRN Cough  ibuprofen  Tablet 600 milliGRAM(s) Oral three times a day PRN myalgia  ondansetron Injectable 4 milliGRAM(s) IV Push every 6 hours PRN Nausea and/or Vomiting   Medications up to date at time of exam.    FAMILY HISTORY:  No pertinent family history in first degree relatives      SOCIAL HISTORY  Smoking History: [   ] smoking/smoke exposure, [ x  ] former smoker, [  ] denies smoking  Living Condition: [ x  ] apartment, [   ] private house  Work History: IT  Travel History: denies recent travel  Illicit Substance Use: denies  Alcohol Use: denies    REVIEW OF SYSTEMS:    CONSTITUTIONAL:  denies fevers, chills, sweats, weight loss    HEENT:  denies diplopia or blurred vision, sore throat or runny nose.    CARDIOVASCULAR:  denies pressure, squeezing, tightness, or heaviness about the chest; no palpitations.    RESPIRATORY:  denies SOB, cough, CHURCHILL, wheezing.    GASTROINTESTINAL:  denies abdominal pain, nausea, vomiting or diarrhea.    GENITOURINARY: denies dysuria, frequency or urgency.    NEUROLOGIC:  denies numbness, tingling, seizures or weakness.    PSYCHIATRIC:  denies disorder of thought or mood.    MSK: denies swelling, redness      PHYSICAL EXAMINATION:    GENERAL: The patient is a well-developed, well-nourished, in no apparent distress.     Vital Signs Last 24 Hrs  T(C): 36.7 (2018 05:55), Max: 38.2 (2018 21:50)  T(F): 98 (2018 05:55), Max: 100.7 (2018 21:50)  HR: 59 (2018 05:55) (59 - 70)  BP: 132/79 (2018 05:55) (96/57 - 132/79)  BP(mean): --  RR: 18 (2018 05:55) (14 - 18)  SpO2: 97% (2018 05:55) (97% - 100%)   (if applicable)    Chest Tube (if applicable)    HEENT: Head is normocephalic and atraumatic. .    NECK: Supple, no palpable adenopathy.    LUNGS: Clear to auscultation, no wheezing, rales, or rhonchi.    HEART: Regular rate and rhythm without murmur.    ABDOMEN: Soft, nontender, and nondistended.  No hepatosplenomegaly is noted.    EXTREMITIES: Without any cyanosis, clubbing, rash, lesions or edema.    NEUROLOGIC: Awake, alert.    SKIN: Warm, dry, good turgor.      LABS:                        11.5   4.3   )-----------( 277      ( 2018 06:17 )             32.8     -18    140  |  108  |  7   ----------------------------<  105<H>  3.4<L>   |  24  |  0.75    Ca    7.8<L>      2018 06:17  Phos  2.5       Mg     2.7         TPro  6.0  /  Alb  2.2<L>  /  TBili  0.4  /  DBili  x   /  AST  91<H>  /  ALT  108<H>  /  AlkPhos  62      PT/INR - ( 2018 05:36 )   PT: 13.4 sec;   INR: 1.22 ratio           Urinalysis Basic - ( 2018 03:22 )    Color: Yellow / Appearance: Clear / S.015 / pH: x  Gluc: x / Ketone: Moderate  / Bili: Negative / Urobili: Negative   Blood: x / Protein: 30 mg/dL / Nitrite: Negative   Leuk Esterase: Negative / RBC: 2-5 /HPF / WBC 0-2 /HPF   Sq Epi: x / Non Sq Epi: Few /HPF / Bacteria: Few /HPF                      MICROBIOLOGY: (if applicable)    RADIOLOGY & ADDITIONAL STUDIES:  EKG:   < from: CT Chest No Cont (18 @ 11:59) >    EXAM:  CT CHEST                            PROCEDURE DATE:  2018          INTERPRETATION:  CLINICAL INFORMATION: Portably PO intake. Cough, fever,   former smoker..    Non contrast CT of the Chest was performed. Coronal and sagittal   reconstructions and axial maximum intensity projection images were   obtained.     COMPARISON: Chest x-ray 2018.    CHEST:    Thyroid: Unremarkable  Heart:  Unremarkable.    Lymph nodes: No significant adenopathy.    Lungs and Pleura: Small bilateral effusions. Left lower lobe low-density   consolidation with air bronchograms likely pneumonia. Correlate   clinically and follow to resolution. Left apical bleb.  Airways: Patent    Visualized abdominal structures:   Hepatic cysts and other hepatic hypodensities too small to characterize..      Osseous structures:   Degenerative changes.    IMPRESSION:    Small bilateral effusions. Left lower lobe low-density consolidation with   air bronchograms likely pneumonia. Correlate clinically and follow to   resolution.                KAMILLE MOORE M.D., ATTENDING RADIOLOGIST  This document has been electronically signed. 2018 11:54AM                < end of copied text >    CXR:  < from: Xray Chest 1 View- PORTABLE-Urgent (18 @ 13:45) >    EXAM:  XR CHEST PORTABLE URGENT 1V                            PROCEDURE DATE:  2018          INTERPRETATION:  PROCEDURE: AP view of the chest.    CLINICAL INFORMATION: Fever.    COMPARISON: None.    FINDINGS:        Lungs: There is a left lower lung consolidation.  Heart: The heart is normal in size.  Mediastinum: The mediastinum is within normal limits.    IMPRESSION:    Left lower lung consolidation, suspicious for pneumonia.                 JULIAN CARVAJAL M.D., ATTENDING RADIOLOGIST  This document has been electronically signed. 2018  1:50PM                < end of copied text >    ECHO:    IMPRESSION: 62y Male PAST MEDICAL & SURGICAL HISTORY:  No pertinent past medical history  No significant past surgical history       Patient is a 62M from home, lives with wife and son, with PMH former smoker presenting with 1 week dry cough, generalized myalgias, fever (max 103), nausea, abdominal pain and poor po intake. Reports that his building is undergoing a lot of window construction and he has inhaled a lot of dust and chemicals over the past week, does not have an AC in his apartment.    SOB due to legionella PNA in the LLL    +fever tmax 103, improving  +air bronchograms tree in bud on CT Chest  +legionella in urine  +hyponatremia, improved  RVP negative    SUGGESTION:   - con't w/ levaquin for legionella   - con't with bronchodilators, o2 supplement as needed.    - DVT and GI prophylaxis. CHIEF COMPLAINT: Patient is a 62y old  Male who presents with a chief complaint of fever (2018 16:25)      HPI:  Patient is a 62M from home, lives with wife and son, with PMH former smoker presenting with 1 week dry cough, generalized myalgias, fever (max 103), nausea, abdominal pain and poor po intake. Reports that his building is undergoing alot of window construction and he has inhaled alot of chemicals over the past week, does not have an AC in his apartment. No sick contacts or travel. Denies SOB, palpitations, diarrhea or constipation. PCP prescribed po augmentin 875mg BID x 1 week, has not completed course, and PRN ibuprofen for fever, but symptoms have not resolved. First time patient has had these symptoms.    PMH: as per HPI  Surgical Hx: nose surgery  Fam Hx: father- heart disease, mother- DM  Social Hx: former smoker- 1 pack/day - quit 5 years ago- 30 pack year; neg for etoh  Allergies: NKDA (2018 16:25)   Patient seen and examined.     PAST MEDICAL & SURGICAL HISTORY:  No pertinent past medical history  No significant past surgical history      Allergies    No Known Allergies    Intolerances        MEDICATIONS  (STANDING):  ALBUTerol/ipratropium for Nebulization 3 milliLiter(s) Nebulizer every 6 hours  enoxaparin Injectable 40 milliGRAM(s) SubCutaneous daily  levoFLOXacin IVPB 750 milliGRAM(s) IV Intermittent every 24 hours  pantoprazole  Injectable 40 milliGRAM(s) IV Push daily  sodium chloride 0.9%. 1000 milliLiter(s) (75 mL/Hr) IV Continuous <Continuous>      MEDICATIONS  (PRN):  acetaminophen   Tablet 650 milliGRAM(s) Oral every 6 hours PRN For Temp greater than 38 C (100.4 F)  guaiFENesin   Syrup  (Sugar-Free) 200 milliGRAM(s) Oral every 6 hours PRN Cough  ibuprofen  Tablet 600 milliGRAM(s) Oral three times a day PRN myalgia  ondansetron Injectable 4 milliGRAM(s) IV Push every 6 hours PRN Nausea and/or Vomiting   Medications up to date at time of exam.    FAMILY HISTORY:  No pertinent family history in first degree relatives      SOCIAL HISTORY  Smoking History: [   ] smoking/smoke exposure, [ x  ] former smoker, [  ] denies smoking  Living Condition: [ x  ] apartment, [   ] private house  Work History: IT  Travel History: denies recent travel  Illicit Substance Use: denies  Alcohol Use: denies    REVIEW OF SYSTEMS:    CONSTITUTIONAL:  denies fevers, chills, sweats, weight loss    HEENT:  denies diplopia or blurred vision, sore throat or runny nose.    CARDIOVASCULAR:  denies pressure, squeezing, tightness, or heaviness about the chest; no palpitations.    RESPIRATORY:  denies SOB, cough, CHURCHILL, wheezing.    GASTROINTESTINAL:  denies abdominal pain, nausea, vomiting or diarrhea.    GENITOURINARY: denies dysuria, frequency or urgency.    NEUROLOGIC:  denies numbness, tingling, seizures or weakness.    PSYCHIATRIC:  denies disorder of thought or mood.    MSK: denies swelling, redness      PHYSICAL EXAMINATION:    GENERAL: The patient is a well-developed, well-nourished, in no apparent distress.     Vital Signs Last 24 Hrs  T(C): 36.7 (2018 05:55), Max: 38.2 (2018 21:50)  T(F): 98 (2018 05:55), Max: 100.7 (2018 21:50)  HR: 59 (2018 05:55) (59 - 70)  BP: 132/79 (2018 05:55) (96/57 - 132/79)  BP(mean): --  RR: 18 (2018 05:55) (14 - 18)  SpO2: 97% (2018 05:55) (97% - 100%)   (if applicable)    Chest Tube (if applicable)    HEENT: Head is normocephalic and atraumatic. .    NECK: Supple, no palpable adenopathy.    LUNGS: Clear to auscultation, no wheezing, rales, or rhonchi.    HEART: Regular rate and rhythm without murmur.    ABDOMEN: Soft, nontender, and nondistended.  No hepatosplenomegaly is noted.    EXTREMITIES: Without any cyanosis, clubbing, rash, lesions or edema.    NEUROLOGIC: Awake, alert.    SKIN: Warm, dry, good turgor.      LABS:                        11.5   4.3   )-----------( 277      ( 2018 06:17 )             32.8     -18    140  |  108  |  7   ----------------------------<  105<H>  3.4<L>   |  24  |  0.75    Ca    7.8<L>      2018 06:17  Phos  2.5       Mg     2.7         TPro  6.0  /  Alb  2.2<L>  /  TBili  0.4  /  DBili  x   /  AST  91<H>  /  ALT  108<H>  /  AlkPhos  62      PT/INR - ( 2018 05:36 )   PT: 13.4 sec;   INR: 1.22 ratio           Urinalysis Basic - ( 2018 03:22 )    Color: Yellow / Appearance: Clear / S.015 / pH: x  Gluc: x / Ketone: Moderate  / Bili: Negative / Urobili: Negative   Blood: x / Protein: 30 mg/dL / Nitrite: Negative   Leuk Esterase: Negative / RBC: 2-5 /HPF / WBC 0-2 /HPF   Sq Epi: x / Non Sq Epi: Few /HPF / Bacteria: Few /HPF                      MICROBIOLOGY: (if applicable)    RADIOLOGY & ADDITIONAL STUDIES:  EKG:   < from: CT Chest No Cont (18 @ 11:59) >    EXAM:  CT CHEST                            PROCEDURE DATE:  2018          INTERPRETATION:  CLINICAL INFORMATION: Portably PO intake. Cough, fever,   former smoker..    Non contrast CT of the Chest was performed. Coronal and sagittal   reconstructions and axial maximum intensity projection images were   obtained.     COMPARISON: Chest x-ray 2018.    CHEST:    Thyroid: Unremarkable  Heart:  Unremarkable.    Lymph nodes: No significant adenopathy.    Lungs and Pleura: Small bilateral effusions. Left lower lobe low-density   consolidation with air bronchograms likely pneumonia. Correlate   clinically and follow to resolution. Left apical bleb.  Airways: Patent    Visualized abdominal structures:   Hepatic cysts and other hepatic hypodensities too small to characterize..      Osseous structures:   Degenerative changes.    IMPRESSION:    Small bilateral effusions. Left lower lobe low-density consolidation with   air bronchograms likely pneumonia. Correlate clinically and follow to   resolution.                KAMILLE MOORE M.D., ATTENDING RADIOLOGIST  This document has been electronically signed. 2018 11:54AM                < end of copied text >    CXR:  < from: Xray Chest 1 View- PORTABLE-Urgent (18 @ 13:45) >    EXAM:  XR CHEST PORTABLE URGENT 1V                            PROCEDURE DATE:  2018          INTERPRETATION:  PROCEDURE: AP view of the chest.    CLINICAL INFORMATION: Fever.    COMPARISON: None.    FINDINGS:        Lungs: There is a left lower lung consolidation.  Heart: The heart is normal in size.  Mediastinum: The mediastinum is within normal limits.    IMPRESSION:    Left lower lung consolidation, suspicious for pneumonia.                 JULIAN CARVAJAL M.D., ATTENDING RADIOLOGIST  This document has been electronically signed. 2018  1:50PM                < end of copied text >    ECHO:    IMPRESSION: 62y Male PAST MEDICAL & SURGICAL HISTORY:  No pertinent past medical history  No significant past surgical history       Patient is a 62M from home, lives with wife and son, with PMH former smoker presenting with 1 week dry cough, generalized myalgias, fever (max 103), nausea, abdominal pain and poor po intake. Reports that his building is undergoing a lot of window construction and he has inhaled a lot of dust and chemicals over the past week, does not have an AC in his apartment.    SOB due to legionella PNA in the LLL    +fever tmax 103, improving  +air bronchograms tree in bud on CT Chest  +legionella in urine  +hyponatremia, improved  RVP negative    SUGGESTION:   - con't w/ levaquin for legionella   - con't with bronchodilators, o2 supplement as needed.    - DVT and GI prophylaxis.       Agree with above assessment and plan as transcribed.

## 2018-06-19 DIAGNOSIS — R74.0 NONSPECIFIC ELEVATION OF LEVELS OF TRANSAMINASE AND LACTIC ACID DEHYDROGENASE [LDH]: ICD-10-CM

## 2018-06-19 LAB
ALBUMIN SERPL ELPH-MCNC: 2.4 G/DL — LOW (ref 3.5–5)
ALP SERPL-CCNC: 75 U/L — SIGNIFICANT CHANGE UP (ref 40–120)
ALT FLD-CCNC: 160 U/L DA — HIGH (ref 10–60)
ANION GAP SERPL CALC-SCNC: 9 MMOL/L — SIGNIFICANT CHANGE UP (ref 5–17)
AST SERPL-CCNC: 136 U/L — HIGH (ref 10–40)
BASOPHILS # BLD AUTO: 0 K/UL — SIGNIFICANT CHANGE UP (ref 0–0.2)
BASOPHILS NFR BLD AUTO: 0.3 % — SIGNIFICANT CHANGE UP (ref 0–2)
BILIRUB SERPL-MCNC: 0.4 MG/DL — SIGNIFICANT CHANGE UP (ref 0.2–1.2)
BUN SERPL-MCNC: 7 MG/DL — SIGNIFICANT CHANGE UP (ref 7–18)
CALCIUM SERPL-MCNC: 8.3 MG/DL — LOW (ref 8.4–10.5)
CHLORIDE SERPL-SCNC: 105 MMOL/L — SIGNIFICANT CHANGE UP (ref 96–108)
CO2 SERPL-SCNC: 24 MMOL/L — SIGNIFICANT CHANGE UP (ref 22–31)
CREAT SERPL-MCNC: 0.75 MG/DL — SIGNIFICANT CHANGE UP (ref 0.5–1.3)
EOSINOPHIL # BLD AUTO: 0.2 K/UL — SIGNIFICANT CHANGE UP (ref 0–0.5)
EOSINOPHIL NFR BLD AUTO: 3.1 % — SIGNIFICANT CHANGE UP (ref 0–6)
GLUCOSE SERPL-MCNC: 103 MG/DL — HIGH (ref 70–99)
HCT VFR BLD CALC: 35.5 % — LOW (ref 39–50)
HGB BLD-MCNC: 12.2 G/DL — LOW (ref 13–17)
LYMPHOCYTES # BLD AUTO: 1.4 K/UL — SIGNIFICANT CHANGE UP (ref 1–3.3)
LYMPHOCYTES # BLD AUTO: 26.2 % — SIGNIFICANT CHANGE UP (ref 13–44)
MAGNESIUM SERPL-MCNC: 2.6 MG/DL — SIGNIFICANT CHANGE UP (ref 1.6–2.6)
MCHC RBC-ENTMCNC: 32 PG — SIGNIFICANT CHANGE UP (ref 27–34)
MCHC RBC-ENTMCNC: 34.4 GM/DL — SIGNIFICANT CHANGE UP (ref 32–36)
MCV RBC AUTO: 93 FL — SIGNIFICANT CHANGE UP (ref 80–100)
MONOCYTES # BLD AUTO: 0.5 K/UL — SIGNIFICANT CHANGE UP (ref 0–0.9)
MONOCYTES NFR BLD AUTO: 9 % — SIGNIFICANT CHANGE UP (ref 2–14)
NEUTROPHILS # BLD AUTO: 3.2 K/UL — SIGNIFICANT CHANGE UP (ref 1.8–7.4)
NEUTROPHILS NFR BLD AUTO: 61.4 % — SIGNIFICANT CHANGE UP (ref 43–77)
PHOSPHATE SERPL-MCNC: 3.3 MG/DL — SIGNIFICANT CHANGE UP (ref 2.5–4.5)
PLATELET # BLD AUTO: 395 K/UL — SIGNIFICANT CHANGE UP (ref 150–400)
POTASSIUM SERPL-MCNC: 3.4 MMOL/L — LOW (ref 3.5–5.3)
POTASSIUM SERPL-SCNC: 3.4 MMOL/L — LOW (ref 3.5–5.3)
PROT SERPL-MCNC: 6.7 G/DL — SIGNIFICANT CHANGE UP (ref 6–8.3)
RBC # BLD: 3.82 M/UL — LOW (ref 4.2–5.8)
RBC # FLD: 12.4 % — SIGNIFICANT CHANGE UP (ref 10.3–14.5)
S PNEUM AG SER QL: SIGNIFICANT CHANGE UP
SODIUM SERPL-SCNC: 138 MMOL/L — SIGNIFICANT CHANGE UP (ref 135–145)
WBC # BLD: 5.2 K/UL — SIGNIFICANT CHANGE UP (ref 3.8–10.5)
WBC # FLD AUTO: 5.2 K/UL — SIGNIFICANT CHANGE UP (ref 3.8–10.5)

## 2018-06-19 RX ORDER — POTASSIUM CHLORIDE 20 MEQ
40 PACKET (EA) ORAL EVERY 4 HOURS
Qty: 0 | Refills: 0 | Status: COMPLETED | OUTPATIENT
Start: 2018-06-19 | End: 2018-06-19

## 2018-06-19 RX ADMIN — Medication 40 MILLIEQUIVALENT(S): at 13:06

## 2018-06-19 RX ADMIN — Medication 3 MILLILITER(S): at 15:45

## 2018-06-19 RX ADMIN — ENOXAPARIN SODIUM 40 MILLIGRAM(S): 100 INJECTION SUBCUTANEOUS at 11:30

## 2018-06-19 RX ADMIN — PANTOPRAZOLE SODIUM 40 MILLIGRAM(S): 20 TABLET, DELAYED RELEASE ORAL at 11:30

## 2018-06-19 RX ADMIN — Medication 3 MILLILITER(S): at 20:39

## 2018-06-19 RX ADMIN — Medication 3 MILLILITER(S): at 09:09

## 2018-06-19 RX ADMIN — Medication 40 MILLIEQUIVALENT(S): at 09:54

## 2018-06-19 NOTE — DISCHARGE NOTE ADULT - MEDICATION SUMMARY - MEDICATIONS TO STOP TAKING
I will STOP taking the medications listed below when I get home from the hospital:    Augmentin 875 mg-125 mg oral tablet  -- 1 tab(s) by mouth every 12 hours    ibuprofen 400 mg oral tablet  -- 1 tab(s) by mouth every 6 hours, As Needed

## 2018-06-19 NOTE — PROGRESS NOTE ADULT - SUBJECTIVE AND OBJECTIVE BOX
PGY 1 Note discussed with supervising resident and primary attending    Patient is a 62y old  Male who presents with a chief complaint of fever (16 Jun 2018 16:25)      INTERVAL HPI/OVERNIGHT EVENTS: offers no new complaints; current symptoms resolving    MEDICATIONS  (STANDING):  ALBUTerol/ipratropium for Nebulization 3 milliLiter(s) Nebulizer every 6 hours  enoxaparin Injectable 40 milliGRAM(s) SubCutaneous daily  levoFLOXacin IVPB 750 milliGRAM(s) IV Intermittent every 24 hours  pantoprazole  Injectable 40 milliGRAM(s) IV Push daily    MEDICATIONS  (PRN):  acetaminophen   Tablet 650 milliGRAM(s) Oral every 6 hours PRN For Temp greater than 38 C (100.4 F)  guaiFENesin   Syrup  (Sugar-Free) 200 milliGRAM(s) Oral every 6 hours PRN Cough  ibuprofen  Tablet 600 milliGRAM(s) Oral three times a day PRN myalgia  ondansetron Injectable 4 milliGRAM(s) IV Push every 6 hours PRN Nausea and/or Vomiting      __________________________________________________    Vital Signs Last 24 Hrs  T(C): 37.3 (19 Jun 2018 05:44), Max: 37.3 (19 Jun 2018 05:44)  T(F): 99.1 (19 Jun 2018 05:44), Max: 99.1 (19 Jun 2018 05:44)  HR: 65 (19 Jun 2018 05:44) (65 - 89)  BP: 131/77 (19 Jun 2018 05:44) (122/80 - 137/69)  BP(mean): --  RR: 18 (19 Jun 2018 05:44) (18 - 18)  SpO2: 97% (19 Jun 2018 05:44) (97% - 98%)    ________________________________________________  PHYSICAL EXAM:  GENERAL: NAD Young [place] male   HEENT: Normocephalic;  conjunctivae and sclerae clear; moist mucous membranes;   NECK : supple  CHEST/LUNG: Clear to auscultation bilaterally, LLL infiltrate, Mild cough    HEART: S1 S2  regular; no murmurs, gallops or rubs  ABDOMEN: Soft, Nontender, Nondistended; Bowel sounds present  EXTREMITIES: no cyanosis; no edema; no calf tenderness  SKIN: warm and dry; no rash  NERVOUS SYSTEM:  Awake and alert x 3    _________________________________________________  LABS:                        11.5   4.3   )-----------( 277      ( 18 Jun 2018 06:17 )             32.8     06-18    140  |  108  |  7   ----------------------------<  105<H>  3.4<L>   |  24  |  0.75    Ca    7.8<L>      18 Jun 2018 06:17  Phos  2.5     06-18  Mg     2.7     06-18    TPro  6.0  /  Alb  2.2<L>  /  TBili  0.4  /  DBili  x   /  AST  91<H>  /  ALT  108<H>  /  AlkPhos  62  06-18      Plan of care was discussed with patient and /or primary care giver; all questions and concerns were addressed and care was aligned with patient's wishes.

## 2018-06-19 NOTE — PROGRESS NOTE ADULT - PROBLEM SELECTOR PLAN 1
CT Chest: LLL infiltrate with B/L effusions  Urine Legionella Positive  Low grade fevers overnight   Blood Cx: NTD  C/w Levaquin until 6/23

## 2018-06-19 NOTE — DISCHARGE NOTE ADULT - MEDICATION SUMMARY - MEDICATIONS TO TAKE
I will START or STAY ON the medications listed below when I get home from the hospital:    guaiFENesin 100 mg/5 mL oral liquid  -- 10 milliliter(s) by mouth every 6 hours, As needed, Cough  -- Indication: For Cough    Levaquin 750 mg oral tablet  -- 1 tab(s) by mouth once a day   -- Avoid prolonged or excessive exposure to direct and/or artificial sunlight while taking this medication.  Do not take dairy products, antacids, or iron preparations within one hour of this medication.  Finish all this medication unless otherwise directed by prescriber.  May cause drowsiness or dizziness.  Medication should be taken with plenty of water.    -- Indication: For Legionella pneumonia

## 2018-06-19 NOTE — PROGRESS NOTE ADULT - SUBJECTIVE AND OBJECTIVE BOX
Time of Visit:  Patient seen and examined.     MEDICATIONS  (STANDING):  ALBUTerol/ipratropium for Nebulization 3 milliLiter(s) Nebulizer every 6 hours  enoxaparin Injectable 40 milliGRAM(s) SubCutaneous daily  levoFLOXacin IVPB 750 milliGRAM(s) IV Intermittent every 24 hours  pantoprazole  Injectable 40 milliGRAM(s) IV Push daily      MEDICATIONS  (PRN):  acetaminophen   Tablet 650 milliGRAM(s) Oral every 6 hours PRN For Temp greater than 38 C (100.4 F)  guaiFENesin   Syrup  (Sugar-Free) 200 milliGRAM(s) Oral every 6 hours PRN Cough  ibuprofen  Tablet 600 milliGRAM(s) Oral three times a day PRN myalgia  ondansetron Injectable 4 milliGRAM(s) IV Push every 6 hours PRN Nausea and/or Vomiting       Medications up to date at time of exam.    ROS: No SOB, fever, chills, cough, congestion.  PHYSICAL EXAMINATION:    Vital Signs Last 24 Hrs  T(C): 36.7 (19 Jun 2018 13:36), Max: 37.3 (19 Jun 2018 05:44)  T(F): 98.1 (19 Jun 2018 13:36), Max: 99.1 (19 Jun 2018 05:44)  HR: 72 (19 Jun 2018 13:36) (65 - 89)  BP: 123/81 (19 Jun 2018 13:36) (123/81 - 137/69)  BP(mean): --  RR: 18 (19 Jun 2018 13:36) (18 - 18)  SpO2: 98% (19 Jun 2018 13:36) (97% - 98%)   (if applicable)    General; Alert and oriented. No acute distress.      HEENT: Normocephalic and atraumatic. No nasal tenderness. Extraocular muscles are intact. Moist mucosa.     NECK: Supple, no palpable adenopathy.    LUNGS: Clear to auscultation, no wheezing, rales, or rhonchi. No use of accessory muscle.     HEART: S1 S2 Regular rate and no click/ rub.     ABDOMEN: Soft, nontender, and nondistended.  No hepatosplenomegaly is noted. Active bowel sounds.     EXTREMITIES: Without any cyanosis, clubbing, rash, lesions or edema.    NEUROLOGIC: Awake, alert, oriented.     SKIN: Warm and moist. Non diaphoretic.       LABS:                        12.2   5.2   )-----------( 395      ( 19 Jun 2018 07:05 )             35.5     06-19    138  |  105  |  7   ----------------------------<  103<H>  3.4<L>   |  24  |  0.75    Ca    8.3<L>      19 Jun 2018 07:05  Phos  3.3     06-19  Mg     2.6     06-19    TPro  6.7  /  Alb  2.4<L>  /  TBili  0.4  /  DBili  x   /  AST  136<H>  /  ALT  160<H>  /  AlkPhos  75  06-19    RADIOLOGY & ADDITIONAL STUDIES:  EKG:   CXR: < from: Xray Chest 1 View- PORTABLE-Routine (06.18.18 @ 09:16) >  INTERPRETATION:  CLINICAL STATEMENT: Follow-up chest pain.    TECHNIQUE: AP view of the chest.    COMPARISON: 6/16/2018    FINDINGS/  IMPRESSION:  Mild opacity left perihilar region without significant change. No new   consolidation or pleural effusion.    Heart size within normal limits      PAST MEDICAL & SURGICAL HISTORY:  No pertinent past medical history  No significant past surgical history     Impression; 63 Y/O Male . Former smoker. Presented with dry cough, generalized myalgias, fever Temp 103, nausea, abdominal pain and poor po intake x 1 week. Reports that his building is undergoing a lot of window construction and he has inhaled a lot of dust and chemicals over the past week, does not have an AC in his apartment. SOB due to legionella PNA in the LLL. urine with + ve Legionella. RVP negative. BLD Cx x 2 and urine Cx negative.     Suggestion;  Continue DuoNeb Q 6 hours.  Continue antibiotic.  DVT/GI prophylactic. Time of Visit:  Patient seen and examined.     MEDICATIONS  (STANDING):  ALBUTerol/ipratropium for Nebulization 3 milliLiter(s) Nebulizer every 6 hours  enoxaparin Injectable 40 milliGRAM(s) SubCutaneous daily  levoFLOXacin IVPB 750 milliGRAM(s) IV Intermittent every 24 hours  pantoprazole  Injectable 40 milliGRAM(s) IV Push daily      MEDICATIONS  (PRN):  acetaminophen   Tablet 650 milliGRAM(s) Oral every 6 hours PRN For Temp greater than 38 C (100.4 F)  guaiFENesin   Syrup  (Sugar-Free) 200 milliGRAM(s) Oral every 6 hours PRN Cough  ibuprofen  Tablet 600 milliGRAM(s) Oral three times a day PRN myalgia  ondansetron Injectable 4 milliGRAM(s) IV Push every 6 hours PRN Nausea and/or Vomiting       Medications up to date at time of exam.    ROS: No SOB, fever, chills, cough, congestion.  PHYSICAL EXAMINATION:    Vital Signs Last 24 Hrs  T(C): 36.7 (19 Jun 2018 13:36), Max: 37.3 (19 Jun 2018 05:44)  T(F): 98.1 (19 Jun 2018 13:36), Max: 99.1 (19 Jun 2018 05:44)  HR: 72 (19 Jun 2018 13:36) (65 - 89)  BP: 123/81 (19 Jun 2018 13:36) (123/81 - 137/69)  BP(mean): --  RR: 18 (19 Jun 2018 13:36) (18 - 18)  SpO2: 98% (19 Jun 2018 13:36) (97% - 98%)   (if applicable)    General; Alert and oriented. No acute distress.      HEENT: Normocephalic and atraumatic. No nasal tenderness. Extraocular muscles are intact. Moist mucosa.     NECK: Supple, no palpable adenopathy.    LUNGS: Clear to auscultation, no wheezing, rales, or rhonchi. No use of accessory muscle.     HEART: S1 S2 Regular rate and no click/ rub.     ABDOMEN: Soft, nontender, and nondistended.  No hepatosplenomegaly is noted. Active bowel sounds.     EXTREMITIES: Without any cyanosis, clubbing, rash, lesions or edema.    NEUROLOGIC: Awake, alert, oriented.     SKIN: Warm and moist. Non diaphoretic.       LABS:                        12.2   5.2   )-----------( 395      ( 19 Jun 2018 07:05 )             35.5     06-19    138  |  105  |  7   ----------------------------<  103<H>  3.4<L>   |  24  |  0.75    Ca    8.3<L>      19 Jun 2018 07:05  Phos  3.3     06-19  Mg     2.6     06-19    TPro  6.7  /  Alb  2.4<L>  /  TBili  0.4  /  DBili  x   /  AST  136<H>  /  ALT  160<H>  /  AlkPhos  75  06-19    RADIOLOGY & ADDITIONAL STUDIES:  EKG:   CXR: < from: Xray Chest 1 View- PORTABLE-Routine (06.18.18 @ 09:16) >  INTERPRETATION:  CLINICAL STATEMENT: Follow-up chest pain.    TECHNIQUE: AP view of the chest.    COMPARISON: 6/16/2018    FINDINGS/  IMPRESSION:  Mild opacity left perihilar region without significant change. No new   consolidation or pleural effusion.    Heart size within normal limits      PAST MEDICAL & SURGICAL HISTORY:  No pertinent past medical history  No significant past surgical history     Impression; 61 Y/O Male . Former smoker. Presented with dry cough, generalized myalgias, fever Temp 103, nausea, abdominal pain and poor po intake x 1 week. Reports that his building is undergoing a lot of window construction and he has inhaled a lot of dust and chemicals over the past week, does not have an AC in his apartment. SOB due to legionella PNA in the LLL. urine with + ve Legionella. RVP negative. BLD Cx x 2 and urine Cx negative.     Suggestion;  Continue DuoNeb Q 6 hours.  Continue antibiotic.  DVT/GI prophylactic.      Agree with above assessment and plan as transcribed.

## 2018-06-19 NOTE — DISCHARGE NOTE ADULT - HOSPITAL COURSE
HPI: Patient is a 62M from home, lives with wife and son, with PMH former smoker presenting with 1 week dry cough, generalized myalgias, fever (max 103), nausea, abdominal pain and poor po intake. Reports that his building is undergoing alot of window construction and he has inhaled alot of chemicals over the past week, does not have an AC in his apartment. No sick contacts or travel. Denies SOB, palpitations, diarrhea or constipation. PCP prescribed po augmentin 875mg BID x 1 week, has not completed course, and PRN ibuprofen for fever, but symptoms have not resolved. First time patient has had these symptoms.    Pt was admitted for Legionella pneumonia. CT Chest showed Left lower lobe infiltrate with B/L effusions. Urine Legionella was Positive. His fevers got resolved with antibiotics Blood Cx stayed negative. He was given Levaquin. Pt also had asymptomatic Transaminitis likely Secondary to Legionella.    Pt is stable for discharge as per Attending. HPI: Patient is a 62M from home, lives with wife and son, with PMH former smoker presenting with 1 week dry cough, generalized myalgias, fever (max 103), nausea, abdominal pain and poor po intake. Reports that his building is undergoing alot of window construction and he has inhaled alot of chemicals over the past week, does not have an AC in his apartment. No sick contacts or travel. Denies SOB, palpitations, diarrhea or constipation. PCP prescribed po augmentin 875mg BID x 1 week, has not completed course, and PRN ibuprofen for fever, but symptoms have not resolved. First time patient has had these symptoms.    Pt was admitted for Legionella pneumonia. CT Chest showed Left lower lobe infiltrate with B/L effusions. Urine Legionella was Positive. His fevers got resolved with antibiotics Blood Cx stayed negative. He was given Levaquin. Pt also had asymptomatic Transaminitis likely Secondary to Legionella. Liver USG and Hepatitis panel was normal.    Pt is stable for discharge as per Attending.

## 2018-06-19 NOTE — DISCHARGE NOTE ADULT - PLAN OF CARE
prevent reinfection Pt was admitted for Legionella pneumonia. CT Chest showed Left lower lobe infiltrate with B/L effusions. Urine Legionella was Positive. Blood Cx stayed negative. You were given Levaquin. You also had asymptomatic Transaminitis likely Secondary to Legionella. See PMD for repeat Chest x ray in 3 months and blood work up in 1 month Pt was admitted for Legionella pneumonia. CT Chest showed Left lower lobe infiltrate with B/L effusions. Urine Legionella was Positive. Blood Cx stayed negative. You were given Levaquin. You also had asymptomatic Transaminitis likely Secondary to Legionella. See PMD for repeat Chest x ray in 3 months and Liver blood work up in 1 week. Liver USG and Hepatitis panel was normal in hospital

## 2018-06-19 NOTE — DISCHARGE NOTE ADULT - PATIENT PORTAL LINK FT
You can access the [facility] Patient Portal, offered by [facility], by registering with the following website: [email or web address]

## 2018-06-19 NOTE — PROGRESS NOTE ADULT - PROBLEM SELECTOR PLAN 2
IMPROVE VTE Individual Risk Assessment.          RISK                                                          Points  [  ] Previous VTE                                                3  [  ] Thrombophilia                                             2  [  ] Lower limb paralysis                                   2        (unable to hold up >15 seconds)    [  ] Current Cancer                                             2         (within 6 months)  [  x] Immobilization > 24 hrs                              1  [  ] ICU/CCU stay > 24 hours                             1  [ x ] Age > 60                                                         1    IMPROVE VTE Score: lovenox for dvt ppx  Protonix for GI ppx Secondary to Legionella

## 2018-06-19 NOTE — DISCHARGE NOTE ADULT - CARE PLAN
Principal Discharge DX:	Legionella pneumonia  Goal:	prevent reinfection  Assessment and plan of treatment:	Pt was admitted for Legionella pneumonia. CT Chest showed Left lower lobe infiltrate with B/L effusions. Urine Legionella was Positive. Blood Cx stayed negative. You were given Levaquin. You also had asymptomatic Transaminitis likely Secondary to Legionella. See PMD for repeat Chest x ray in 3 months and blood work up in 1 month Principal Discharge DX:	Legionella pneumonia  Goal:	prevent reinfection  Assessment and plan of treatment:	Pt was admitted for Legionella pneumonia. CT Chest showed Left lower lobe infiltrate with B/L effusions. Urine Legionella was Positive. Blood Cx stayed negative. You were given Levaquin. You also had asymptomatic Transaminitis likely Secondary to Legionella. See PMD for repeat Chest x ray in 3 months and Liver blood work up in 1 week. Liver USG and Hepatitis panel was normal in hospital

## 2018-06-20 LAB
ALBUMIN SERPL ELPH-MCNC: 2.7 G/DL — LOW (ref 3.5–5)
ALP SERPL-CCNC: 92 U/L — SIGNIFICANT CHANGE UP (ref 40–120)
ALT FLD-CCNC: 288 U/L DA — HIGH (ref 10–60)
ANION GAP SERPL CALC-SCNC: 8 MMOL/L — SIGNIFICANT CHANGE UP (ref 5–17)
AST SERPL-CCNC: 277 U/L — HIGH (ref 10–40)
BILIRUB SERPL-MCNC: 0.4 MG/DL — SIGNIFICANT CHANGE UP (ref 0.2–1.2)
BUN SERPL-MCNC: 8 MG/DL — SIGNIFICANT CHANGE UP (ref 7–18)
CALCIUM SERPL-MCNC: 8.8 MG/DL — SIGNIFICANT CHANGE UP (ref 8.4–10.5)
CHLORIDE SERPL-SCNC: 104 MMOL/L — SIGNIFICANT CHANGE UP (ref 96–108)
CO2 SERPL-SCNC: 26 MMOL/L — SIGNIFICANT CHANGE UP (ref 22–31)
CREAT SERPL-MCNC: 0.83 MG/DL — SIGNIFICANT CHANGE UP (ref 0.5–1.3)
GLUCOSE SERPL-MCNC: 108 MG/DL — HIGH (ref 70–99)
HAV IGM SER-ACNC: SIGNIFICANT CHANGE UP
HBV CORE IGM SER-ACNC: SIGNIFICANT CHANGE UP
HBV SURFACE AG SER-ACNC: SIGNIFICANT CHANGE UP
HCV AB S/CO SERPL IA: 0.14 S/CO — SIGNIFICANT CHANGE UP
HCV AB SERPL-IMP: SIGNIFICANT CHANGE UP
POTASSIUM SERPL-MCNC: 4 MMOL/L — SIGNIFICANT CHANGE UP (ref 3.5–5.3)
POTASSIUM SERPL-SCNC: 4 MMOL/L — SIGNIFICANT CHANGE UP (ref 3.5–5.3)
PROT SERPL-MCNC: 7 G/DL — SIGNIFICANT CHANGE UP (ref 6–8.3)
SODIUM SERPL-SCNC: 138 MMOL/L — SIGNIFICANT CHANGE UP (ref 135–145)

## 2018-06-20 PROCEDURE — 76705 ECHO EXAM OF ABDOMEN: CPT | Mod: 26

## 2018-06-20 RX ADMIN — Medication 3 MILLILITER(S): at 10:24

## 2018-06-20 RX ADMIN — PANTOPRAZOLE SODIUM 40 MILLIGRAM(S): 20 TABLET, DELAYED RELEASE ORAL at 11:45

## 2018-06-20 RX ADMIN — ENOXAPARIN SODIUM 40 MILLIGRAM(S): 100 INJECTION SUBCUTANEOUS at 11:45

## 2018-06-20 RX ADMIN — Medication 3 MILLILITER(S): at 21:47

## 2018-06-20 RX ADMIN — Medication 3 MILLILITER(S): at 15:24

## 2018-06-20 RX ADMIN — Medication 3 MILLILITER(S): at 02:18

## 2018-06-20 NOTE — PROGRESS NOTE ADULT - PROBLEM SELECTOR PLAN 1
CT Chest: LLL infiltrate with B/L effusions  Urine Legionella Positive  Fevers resolved  Blood Cx: NTD  C/w Levaquin until 6/23  Dr Arriaga on board

## 2018-06-20 NOTE — PROGRESS NOTE ADULT - SUBJECTIVE AND OBJECTIVE BOX
Patient seen and examined.     MEDICATIONS  (STANDING):  ALBUTerol/ipratropium for Nebulization 3 milliLiter(s) Nebulizer every 6 hours  enoxaparin Injectable 40 milliGRAM(s) SubCutaneous daily  levoFLOXacin IVPB 750 milliGRAM(s) IV Intermittent every 24 hours  pantoprazole  Injectable 40 milliGRAM(s) IV Push daily      MEDICATIONS  (PRN):  acetaminophen   Tablet 650 milliGRAM(s) Oral every 6 hours PRN For Temp greater than 38 C (100.4 F)  guaiFENesin   Syrup  (Sugar-Free) 200 milliGRAM(s) Oral every 6 hours PRN Cough  ibuprofen  Tablet 600 milliGRAM(s) Oral three times a day PRN myalgia  ondansetron Injectable 4 milliGRAM(s) IV Push every 6 hours PRN Nausea and/or Vomiting     Medications up to date at time of exam.    PHYSICAL EXAMINATION:  Patient has no new complaints.  GENERAL: The patient is a well-developed, well-nourished, in no apparent distress.     Vital Signs Last 24 Hrs  T(C): 37.3 (20 Jun 2018 05:02), Max: 37.3 (20 Jun 2018 05:02)  T(F): 99.1 (20 Jun 2018 05:02), Max: 99.1 (20 Jun 2018 05:02)  HR: 71 (20 Jun 2018 05:02) (71 - 97)  BP: 120/71 (20 Jun 2018 05:02) (120/71 - 121/76)  BP(mean): --  RR: 16 (20 Jun 2018 05:02) (16 - 16)  SpO2: 95% (20 Jun 2018 05:02) (95% - 99%)   (if applicable)    Chest Tube (if applicable)    HEENT: Head is normocephalic and atraumatic.     NECK: Supple, no palpable adenopathy.    LUNGS: Clear to auscultation, no wheezing, rales, or rhonchi.    HEART: Regular rate and rhythm without murmur.    ABDOMEN: Soft, nontender, and nondistended.      EXTREMITIES: Without any cyanosis, clubbing, rash, lesions or edema.    NEUROLOGIC: Awake, alert.    SKIN: Warm, dry, good turgor.    LABS:                        12.2   5.2   )-----------( 395      ( 19 Jun 2018 07:05 )             35.5     06-20    138  |  104  |  8   ----------------------------<  108<H>  4.0   |  26  |  0.83    Ca    8.8      20 Jun 2018 07:28  Phos  3.3     06-19  Mg     2.6     06-19    TPro  7.0  /  Alb  2.7<L>  /  TBili  0.4  /  DBili  x   /  AST  277<H>  /  ALT  288<H>  /  AlkPhos  92  06-20                        MICROBIOLOGY: (if applicable)    RADIOLOGY & ADDITIONAL STUDIES:  EKG:   CXR:  ECHO:    IMPRESSION: 62y Male PAST MEDICAL & SURGICAL HISTORY:  No pertinent past medical history  No significant past surgical history     Patient is a 62M from home, lives with wife and son, with PMH former smoker presenting with 1 week dry cough, generalized myalgias, fever (max 103), nausea, abdominal pain and poor po intake. Reports that his building is undergoing a lot of window construction and he has inhaled a lot of dust and chemicals over the past week, does not have an AC in his apartment.    SOB due to legionella PNA in the LLL    +fever tmax 103, improving  +air bronchograms tree in bud on CT Chest  +legionella in urine  +hyponatremia, improved  RVP negative    SUGGESTION:   - con't w/ levaquin for legionella, consider d/c with PO    - con't with bronchodilators, o2 supplement as needed.    - DVT and GI prophylaxis. Patient seen and examined.     MEDICATIONS  (STANDING):  ALBUTerol/ipratropium for Nebulization 3 milliLiter(s) Nebulizer every 6 hours  enoxaparin Injectable 40 milliGRAM(s) SubCutaneous daily  levoFLOXacin IVPB 750 milliGRAM(s) IV Intermittent every 24 hours  pantoprazole  Injectable 40 milliGRAM(s) IV Push daily      MEDICATIONS  (PRN):  acetaminophen   Tablet 650 milliGRAM(s) Oral every 6 hours PRN For Temp greater than 38 C (100.4 F)  guaiFENesin   Syrup  (Sugar-Free) 200 milliGRAM(s) Oral every 6 hours PRN Cough  ibuprofen  Tablet 600 milliGRAM(s) Oral three times a day PRN myalgia  ondansetron Injectable 4 milliGRAM(s) IV Push every 6 hours PRN Nausea and/or Vomiting     Medications up to date at time of exam.    PHYSICAL EXAMINATION:  Patient has no new complaints.  GENERAL: The patient is a well-developed, well-nourished, in no apparent distress.     Vital Signs Last 24 Hrs  T(C): 37.3 (20 Jun 2018 05:02), Max: 37.3 (20 Jun 2018 05:02)  T(F): 99.1 (20 Jun 2018 05:02), Max: 99.1 (20 Jun 2018 05:02)  HR: 71 (20 Jun 2018 05:02) (71 - 97)  BP: 120/71 (20 Jun 2018 05:02) (120/71 - 121/76)  BP(mean): --  RR: 16 (20 Jun 2018 05:02) (16 - 16)  SpO2: 95% (20 Jun 2018 05:02) (95% - 99%)   (if applicable)    Chest Tube (if applicable)    HEENT: Head is normocephalic and atraumatic.     NECK: Supple, no palpable adenopathy.    LUNGS: Clear to auscultation, no wheezing, rales, or rhonchi.    HEART: Regular rate and rhythm without murmur.    ABDOMEN: Soft, nontender, and nondistended.      EXTREMITIES: Without any cyanosis, clubbing, rash, lesions or edema.    NEUROLOGIC: Awake, alert.    SKIN: Warm, dry, good turgor.    LABS:                        12.2   5.2   )-----------( 395      ( 19 Jun 2018 07:05 )             35.5     06-20    138  |  104  |  8   ----------------------------<  108<H>  4.0   |  26  |  0.83    Ca    8.8      20 Jun 2018 07:28  Phos  3.3     06-19  Mg     2.6     06-19    TPro  7.0  /  Alb  2.7<L>  /  TBili  0.4  /  DBili  x   /  AST  277<H>  /  ALT  288<H>  /  AlkPhos  92  06-20                        MICROBIOLOGY: (if applicable)    RADIOLOGY & ADDITIONAL STUDIES:  EKG:   CXR:  ECHO:    IMPRESSION: 62y Male PAST MEDICAL & SURGICAL HISTORY:  No pertinent past medical history  No significant past surgical history     Patient is a 62M from home, lives with wife and son, with PMH former smoker presenting with 1 week dry cough, generalized myalgias, fever (max 103), nausea, abdominal pain and poor po intake. Reports that his building is undergoing a lot of window construction and he has inhaled a lot of dust and chemicals over the past week, does not have an AC in his apartment.    SOB due to legionella PNA in the LLL    +fever tmax 103, improving  +air bronchograms tree in bud on CT Chest  +legionella in urine  +hyponatremia, improved  RVP negative    SUGGESTION:   - con't w/ levaquin for legionella, consider d/c with PO    - con't with bronchodilators, o2 supplement as needed.    - DVT and GI prophylaxis.     Agree with above assessment and plan as transcribed.

## 2018-06-20 NOTE — PROGRESS NOTE ADULT - SUBJECTIVE AND OBJECTIVE BOX
PGY 1 Note discussed with supervising resident and primary attending    Patient is a 62y old  Male who presents with a chief complaint of fever (19 Jun 2018 14:48)      INTERVAL HPI/OVERNIGHT EVENTS: offers no new complaints; current symptoms resolving    MEDICATIONS  (STANDING):  ALBUTerol/ipratropium for Nebulization 3 milliLiter(s) Nebulizer every 6 hours  enoxaparin Injectable 40 milliGRAM(s) SubCutaneous daily  levoFLOXacin IVPB 750 milliGRAM(s) IV Intermittent every 24 hours  pantoprazole  Injectable 40 milliGRAM(s) IV Push daily    MEDICATIONS  (PRN):  acetaminophen   Tablet 650 milliGRAM(s) Oral every 6 hours PRN For Temp greater than 38 C (100.4 F)  guaiFENesin   Syrup  (Sugar-Free) 200 milliGRAM(s) Oral every 6 hours PRN Cough  ibuprofen  Tablet 600 milliGRAM(s) Oral three times a day PRN myalgia  ondansetron Injectable 4 milliGRAM(s) IV Push every 6 hours PRN Nausea and/or Vomiting      __________________________________________________    Vital Signs Last 24 Hrs  T(C): 37.3 (20 Jun 2018 05:02), Max: 37.3 (20 Jun 2018 05:02)  T(F): 99.1 (20 Jun 2018 05:02), Max: 99.1 (20 Jun 2018 05:02)  HR: 71 (20 Jun 2018 05:02) (71 - 97)  BP: 120/71 (20 Jun 2018 05:02) (120/71 - 123/81)  BP(mean): --  RR: 16 (20 Jun 2018 05:02) (16 - 18)  SpO2: 95% (20 Jun 2018 05:02) (95% - 99%)    ________________________________________________  PHYSICAL EXAM:  GENERAL: NAD Young [place] male   HEENT: Normocephalic;  conjunctivae and sclerae clear; moist mucous membranes;   NECK : supple  CHEST/LUNG: Clear to auscultation bilaterally, LLL infiltrate, Mild cough    HEART: S1 S2  regular; no murmurs, gallops or rubs  ABDOMEN: Soft, Nontender, Nondistended; Bowel sounds present  EXTREMITIES: no cyanosis; no edema; no calf tenderness  SKIN: warm and dry; no rash  NERVOUS SYSTEM:  Awake and alert x 3    _________________________________________________  LABS:                        12.2   5.2   )-----------( 395      ( 19 Jun 2018 07:05 )             35.5     06-19    138  |  105  |  7   ----------------------------<  103<H>  3.4<L>   |  24  |  0.75    Ca    8.3<L>      19 Jun 2018 07:05  Phos  3.3     06-19  Mg     2.6     06-19    TPro  6.7  /  Alb  2.4<L>  /  TBili  0.4  /  DBili  x   /  AST  136<H>  /  ALT  160<H>  /  AlkPhos  75  06-19      Plan of care was discussed with patient and /or primary care giver; all questions and concerns were addressed and care was aligned with patient's wishes.

## 2018-06-21 VITALS
TEMPERATURE: 98 F | HEART RATE: 81 BPM | RESPIRATION RATE: 17 BRPM | SYSTOLIC BLOOD PRESSURE: 107 MMHG | DIASTOLIC BLOOD PRESSURE: 76 MMHG | OXYGEN SATURATION: 97 %

## 2018-06-21 LAB
ALBUMIN SERPL ELPH-MCNC: 2.7 G/DL — LOW (ref 3.5–5)
ALP SERPL-CCNC: 94 U/L — SIGNIFICANT CHANGE UP (ref 40–120)
ALT FLD-CCNC: 292 U/L DA — HIGH (ref 10–60)
ANION GAP SERPL CALC-SCNC: 9 MMOL/L — SIGNIFICANT CHANGE UP (ref 5–17)
AST SERPL-CCNC: 179 U/L — HIGH (ref 10–40)
BILIRUB SERPL-MCNC: 0.4 MG/DL — SIGNIFICANT CHANGE UP (ref 0.2–1.2)
BUN SERPL-MCNC: 10 MG/DL — SIGNIFICANT CHANGE UP (ref 7–18)
CALCIUM SERPL-MCNC: 9.1 MG/DL — SIGNIFICANT CHANGE UP (ref 8.4–10.5)
CHLORIDE SERPL-SCNC: 103 MMOL/L — SIGNIFICANT CHANGE UP (ref 96–108)
CO2 SERPL-SCNC: 25 MMOL/L — SIGNIFICANT CHANGE UP (ref 22–31)
CREAT SERPL-MCNC: 0.84 MG/DL — SIGNIFICANT CHANGE UP (ref 0.5–1.3)
GLUCOSE SERPL-MCNC: 112 MG/DL — HIGH (ref 70–99)
POTASSIUM SERPL-MCNC: 3.9 MMOL/L — SIGNIFICANT CHANGE UP (ref 3.5–5.3)
POTASSIUM SERPL-SCNC: 3.9 MMOL/L — SIGNIFICANT CHANGE UP (ref 3.5–5.3)
PROT SERPL-MCNC: 7.3 G/DL — SIGNIFICANT CHANGE UP (ref 6–8.3)
SODIUM SERPL-SCNC: 137 MMOL/L — SIGNIFICANT CHANGE UP (ref 135–145)

## 2018-06-21 PROCEDURE — 87486 CHLMYD PNEUM DNA AMP PROBE: CPT

## 2018-06-21 PROCEDURE — 96375 TX/PRO/DX INJ NEW DRUG ADDON: CPT

## 2018-06-21 PROCEDURE — 82607 VITAMIN B-12: CPT

## 2018-06-21 PROCEDURE — 84100 ASSAY OF PHOSPHORUS: CPT

## 2018-06-21 PROCEDURE — 94640 AIRWAY INHALATION TREATMENT: CPT

## 2018-06-21 PROCEDURE — 87040 BLOOD CULTURE FOR BACTERIA: CPT

## 2018-06-21 PROCEDURE — 71045 X-RAY EXAM CHEST 1 VIEW: CPT

## 2018-06-21 PROCEDURE — 96374 THER/PROPH/DIAG INJ IV PUSH: CPT

## 2018-06-21 PROCEDURE — 85027 COMPLETE CBC AUTOMATED: CPT

## 2018-06-21 PROCEDURE — 82306 VITAMIN D 25 HYDROXY: CPT

## 2018-06-21 PROCEDURE — 82746 ASSAY OF FOLIC ACID SERUM: CPT

## 2018-06-21 PROCEDURE — 83735 ASSAY OF MAGNESIUM: CPT

## 2018-06-21 PROCEDURE — 83605 ASSAY OF LACTIC ACID: CPT

## 2018-06-21 PROCEDURE — 87581 M.PNEUMON DNA AMP PROBE: CPT

## 2018-06-21 PROCEDURE — 83036 HEMOGLOBIN GLYCOSYLATED A1C: CPT

## 2018-06-21 PROCEDURE — 87633 RESP VIRUS 12-25 TARGETS: CPT

## 2018-06-21 PROCEDURE — 81001 URINALYSIS AUTO W/SCOPE: CPT

## 2018-06-21 PROCEDURE — 87798 DETECT AGENT NOS DNA AMP: CPT

## 2018-06-21 PROCEDURE — 76705 ECHO EXAM OF ABDOMEN: CPT

## 2018-06-21 PROCEDURE — 87449 NOS EACH ORGANISM AG IA: CPT

## 2018-06-21 PROCEDURE — 80074 ACUTE HEPATITIS PANEL: CPT

## 2018-06-21 PROCEDURE — 87899 AGENT NOS ASSAY W/OPTIC: CPT

## 2018-06-21 PROCEDURE — 80053 COMPREHEN METABOLIC PANEL: CPT

## 2018-06-21 PROCEDURE — 87086 URINE CULTURE/COLONY COUNT: CPT

## 2018-06-21 PROCEDURE — 84443 ASSAY THYROID STIM HORMONE: CPT

## 2018-06-21 PROCEDURE — 99285 EMERGENCY DEPT VISIT HI MDM: CPT | Mod: 25

## 2018-06-21 PROCEDURE — 80061 LIPID PANEL: CPT

## 2018-06-21 PROCEDURE — 85610 PROTHROMBIN TIME: CPT

## 2018-06-21 PROCEDURE — 71250 CT THORAX DX C-: CPT

## 2018-06-21 RX ORDER — IBUPROFEN 200 MG
1 TABLET ORAL
Qty: 0 | Refills: 0 | COMMUNITY

## 2018-06-21 RX ADMIN — Medication 3 MILLILITER(S): at 09:33

## 2018-06-21 RX ADMIN — PANTOPRAZOLE SODIUM 40 MILLIGRAM(S): 20 TABLET, DELAYED RELEASE ORAL at 11:36

## 2018-06-21 RX ADMIN — Medication 3 MILLILITER(S): at 03:27

## 2018-06-21 RX ADMIN — ENOXAPARIN SODIUM 40 MILLIGRAM(S): 100 INJECTION SUBCUTANEOUS at 11:36

## 2018-06-21 RX ADMIN — Medication 3 MILLILITER(S): at 15:16

## 2018-06-21 NOTE — PROGRESS NOTE ADULT - PROBLEM SELECTOR PLAN 3
IMPROVE VTE Individual Risk Assessment.          RISK                                                          Points  [  ] Previous VTE                                                3  [  ] Thrombophilia                                             2  [  ] Lower limb paralysis                                   2        (unable to hold up >15 seconds)    [  ] Current Cancer                                             2         (within 6 months)  [  x] Immobilization > 24 hrs                              1  [  ] ICU/CCU stay > 24 hours                             1  [ x ] Age > 60                                                         1    IMPROVE VTE Score: Lovenox for dvt ppx  Protonix for GI ppx

## 2018-06-21 NOTE — PROGRESS NOTE ADULT - ASSESSMENT
Patient is a 62M from home, lives with wife and son, with PMH former smoker presenting with 1 week dry cough, generalized myalgias, fever (max 103), nausea, abdominal pain and poor po intake. Admitted for left lower lobe PNA, r/o viral illness, Legionella.
Patient is a 62M from home, lives with wife and son, with PMH former smoker presenting with 1 week dry cough, generalized myalgias, fever (max 103), nausea, abdominal pain and poor po intake. Admitted for left lower lobe PNA and Legionella.
Patient is a 62M from home, lives with wife and son, with PMH former smoker presenting with 1 week dry cough, generalized myalgias, fever (max 103), nausea, abdominal pain and poor po intake. Admitted for left lower lobe PNA and Legionella.
Patient is a 62M from home, lives with wife and son, with PMH former smoker presenting with 1 week dry cough, generalized myalgias, fever (max 103), nausea, abdominal pain and poor po intake. Admitted for left lower lobe PNA, r/o viral illness, Legionella.
Patient is a 62M from home, lives with wife and son, with PMH former smoker presenting with 1 week dry cough, generalized myalgias, fever (max 103), nausea, abdominal pain and poor po intake. Admitted for left lower lobe PNA and Legionella.

## 2018-06-21 NOTE — PROGRESS NOTE ADULT - NSHPATTENDINGPLANDISCUSS_GEN_ALL_CORE
Agree with above assessment and plan as transcribed.
pt and house staff
pt and house staff covering pt

## 2018-06-21 NOTE — PROGRESS NOTE ADULT - SUBJECTIVE AND OBJECTIVE BOX
Time of Visit:  Patient seen and examined.     MEDICATIONS  (STANDING):  ALBUTerol/ipratropium for Nebulization 3 milliLiter(s) Nebulizer every 6 hours  enoxaparin Injectable 40 milliGRAM(s) SubCutaneous daily  levoFLOXacin IVPB 750 milliGRAM(s) IV Intermittent every 24 hours  pantoprazole  Injectable 40 milliGRAM(s) IV Push daily      MEDICATIONS  (PRN):  acetaminophen   Tablet 650 milliGRAM(s) Oral every 6 hours PRN For Temp greater than 38 C (100.4 F)  guaiFENesin   Syrup  (Sugar-Free) 200 milliGRAM(s) Oral every 6 hours PRN Cough  ibuprofen  Tablet 600 milliGRAM(s) Oral three times a day PRN myalgia  ondansetron Injectable 4 milliGRAM(s) IV Push every 6 hours PRN Nausea and/or Vomiting       Medications up to date at time of exam.    ROS: No fever on exam, chills, congestion, cough, SOB.   PHYSICAL EXAMINATION:    Vital Signs Last 24 Hrs  T(C): 37.1 (21 Jun 2018 05:11), Max: 37.1 (21 Jun 2018 05:11)  T(F): 98.8 (21 Jun 2018 05:11), Max: 98.8 (21 Jun 2018 05:11)  HR: 83 (21 Jun 2018 05:11) (74 - 83)  BP: 116/72 (21 Jun 2018 05:11) (116/72 - 125/82)  BP(mean): --  RR: 16 (21 Jun 2018 05:11) (16 - 16)  SpO2: 95% (21 Jun 2018 05:11) (95% - 98%)   (if applicable)    General; Alert and oriented. No acute distress. Able to answer question with no SOB.      HEENT: Normocephalic and atraumatic. No nasal tenderness. Extraocular muscles are intact. Moist mucosa.     NECK: Supple, no palpable adenopathy.    LUNGS: Clear to auscultation B/L. No wheezing, rales, or rhonchi. No use of accessory muscle.     HEART: S1 S2 Regular rate and no click/ rub.     ABDOMEN: Soft, nontender, and nondistended.  No hepatosplenomegaly is noted. Active bowel sounds.     EXTREMITIES: Without any cyanosis, clubbing, rash, lesions or edema.    NEUROLOGIC: Awake, alert, oriented.     SKIN: Warm and moist. Non diaphoretic.       LABS:    06-21    137  |  103  |  10  ----------------------------<  112<H>  3.9   |  25  |  0.84    Ca    9.1      21 Jun 2018 06:55    TPro  7.3  /  Alb  2.7<L>  /  TBili  0.4  /  DBili  x   /  AST  179<H>  /  ALT  292<H>  /  AlkPhos  94  06-21    RADIOLOGY & ADDITIONAL STUDIES:  EKG:   CXR: < from: Xray Chest 1 View- PORTABLE-Routine (06.18.18 @ 09:16) >    PROCEDURE DATE:  06/18/2018          INTERPRETATION:  CLINICAL STATEMENT: Follow-up chest pain.    TECHNIQUE: AP view of the chest.    COMPARISON: 6/16/2018    FINDINGS/  IMPRESSION:  Mild opacity left perihilar region without significant change. No new   consolidation or pleural effusion.    Heart size within normal limits      PAST MEDICAL & SURGICAL HISTORY:  No pertinent past medical history  No significant past surgical history     Impression; 63 Y/O Male . Former smoker. Presented with dry cough, generalized myalgias, fever Temp 103, nausea, abdominal pain and poor po intake x 1 week. Reports that his building is undergoing a lot of window construction and he has inhaled a lot of dust and chemicals over the past week, does not have an AC in his apartment. SOB due to legionella PNA in the LLL. urine with + ve Legionella. RVP negative. BLD Cx x 2 and urine Cx negative.     Suggestion;  Continue DuoNeb Q 6 hours.  Continue antibiotic.  DVT/GI prophylactic.

## 2018-06-21 NOTE — PROGRESS NOTE ADULT - SUBJECTIVE AND OBJECTIVE BOX
PGY 1 Note discussed with supervising resident and primary attending    Patient is a 62y old  Male who presents with a chief complaint of fever (19 Jun 2018 14:48)      INTERVAL HPI/OVERNIGHT EVENTS: offers no new complaints; current symptoms resolving    MEDICATIONS  (STANDING):  ALBUTerol/ipratropium for Nebulization 3 milliLiter(s) Nebulizer every 6 hours  enoxaparin Injectable 40 milliGRAM(s) SubCutaneous daily  levoFLOXacin IVPB 750 milliGRAM(s) IV Intermittent every 24 hours  pantoprazole  Injectable 40 milliGRAM(s) IV Push daily    MEDICATIONS  (PRN):  acetaminophen   Tablet 650 milliGRAM(s) Oral every 6 hours PRN For Temp greater than 38 C (100.4 F)  guaiFENesin   Syrup  (Sugar-Free) 200 milliGRAM(s) Oral every 6 hours PRN Cough  ibuprofen  Tablet 600 milliGRAM(s) Oral three times a day PRN myalgia  ondansetron Injectable 4 milliGRAM(s) IV Push every 6 hours PRN Nausea and/or Vomiting      __________________________________________________  Vital Signs Last 24 Hrs  T(C): 37.1 (21 Jun 2018 05:11), Max: 37.1 (21 Jun 2018 05:11)  T(F): 98.8 (21 Jun 2018 05:11), Max: 98.8 (21 Jun 2018 05:11)  HR: 83 (21 Jun 2018 05:11) (74 - 83)  BP: 116/72 (21 Jun 2018 05:11) (116/72 - 125/82)  BP(mean): --  RR: 16 (21 Jun 2018 05:11) (16 - 16)  SpO2: 95% (21 Jun 2018 05:11) (95% - 98%)    ________________________________________________  PHYSICAL EXAM:  GENERAL: NAD Young [place] male   HEENT: Normocephalic;  conjunctivae and sclerae clear; moist mucous membranes;   NECK : supple  CHEST/LUNG: Clear to auscultation bilaterally, LLL infiltrate, Mild cough    HEART: S1 S2  regular; no murmurs, gallops or rubs  ABDOMEN: Soft, Nontender, Nondistended; Bowel sounds present  EXTREMITIES: no cyanosis; no edema; no calf tenderness  SKIN: warm and dry; no rash  NERVOUS SYSTEM:  Awake and alert x 3    _________________________________________________  LABS:    06-21    137  |  103  |  10  ----------------------------<  112<H>  3.9   |  25  |  0.84    Ca    9.1      21 Jun 2018 06:55    TPro  7.3  /  Alb  2.7<L>  /  TBili  0.4  /  DBili  x   /  AST  179<H>  /  ALT  292<H>  /  AlkPhos  94  06-21    Plan of care was discussed with patient and /or primary care giver; all questions and concerns were addressed and care was aligned with patient's wishes.

## 2018-06-22 LAB
CULTURE RESULTS: SIGNIFICANT CHANGE UP
CULTURE RESULTS: SIGNIFICANT CHANGE UP
SPECIMEN SOURCE: SIGNIFICANT CHANGE UP
SPECIMEN SOURCE: SIGNIFICANT CHANGE UP

## 2020-01-08 NOTE — PATIENT PROFILE ADULT. - PRO ARRIVE FROM
[Atraumatic] : atraumatic [Normocephalic] : normocephalic [Supple] : supple [No Supraclavicular Adenopathy] : no supraclavicular adenopathy [Examined in the supine and seated position] : examined in the supine and seated position [No dominant masses] : no dominant masses in right breast  [No dominant masses] : no dominant masses left breast [No Nipple Retraction] : no left nipple retraction [No Nipple Discharge] : no left nipple discharge [No Axillary Lymphadenopathy] : no left axillary lymphadenopathy [No Edema] : no edema [No Ulceration] : no ulceration [No Rashes] : no rashes home

## 2021-11-14 NOTE — DISCHARGE NOTE ADULT - NS MD DC FALL RISK RISK
[facility] [place] [facility] [place] approx 1100.      Cheikh Sahni RN  11/14/21 [id number] For information on Fall & Injury Prevention, visit [email or web address]
